# Patient Record
Sex: FEMALE | Race: WHITE | NOT HISPANIC OR LATINO | Employment: UNEMPLOYED | URBAN - METROPOLITAN AREA
[De-identification: names, ages, dates, MRNs, and addresses within clinical notes are randomized per-mention and may not be internally consistent; named-entity substitution may affect disease eponyms.]

---

## 2021-08-16 ENCOUNTER — TELEPHONE (OUTPATIENT)
Dept: NEPHROLOGY | Facility: CLINIC | Age: 56
End: 2021-08-16

## 2021-08-16 DIAGNOSIS — E87.5 HYPERKALEMIA: Primary | ICD-10-CM

## 2021-08-16 NOTE — TELEPHONE ENCOUNTER
[facility] Call Center    Phone Message    May a detailed message be left on voicemail: yes     Reason for Call: Order(s): Other:   Reason for requested: Order for labs to be sent to [facility].  Date needed: ASAP  Provider name: Dr. Galarza      Action Taken: Message routed to:  [facility] ([facility]): Nephrology    Travel Screening: Not Applicable

## 2021-08-23 NOTE — TELEPHONE ENCOUNTER
Future Lab Orders placed per Nephrology Protocol.  This N called [facility] [phone].  Obtained Lab Fax [phone].    Faxed Future Lab Order for Dr. Galarza with a Cover Sheet requesting results be Faxed back to Dr. Galarza.    Confirmation success using RightFax.    Kaylyn Terry LPN

## 2021-09-04 ENCOUNTER — HEALTH MAINTENANCE LETTER (OUTPATIENT)
Age: 56
End: 2021-09-04

## 2021-09-21 ENCOUNTER — OFFICE VISIT (OUTPATIENT)
Dept: FAMILY MEDICINE | Facility: CLINIC | Age: 56
End: 2021-09-21
Payer: COMMERCIAL

## 2021-09-21 VITALS
DIASTOLIC BLOOD PRESSURE: 84 MMHG | SYSTOLIC BLOOD PRESSURE: 130 MMHG | BODY MASS INDEX: 31.15 KG/M2 | HEIGHT: 63 IN | HEART RATE: 67 BPM | OXYGEN SATURATION: 97 % | WEIGHT: 175.8 LBS | RESPIRATION RATE: 16 BRPM | TEMPERATURE: 98.5 F

## 2021-09-21 DIAGNOSIS — I25.10 CORONARY ARTERY DISEASE INVOLVING NATIVE CORONARY ARTERY OF NATIVE HEART WITHOUT ANGINA PECTORIS: ICD-10-CM

## 2021-09-21 DIAGNOSIS — Z12.31 ENCOUNTER FOR SCREENING MAMMOGRAM FOR BREAST CANCER: ICD-10-CM

## 2021-09-21 DIAGNOSIS — I10 HYPERTENSION GOAL BP (BLOOD PRESSURE) < 140/90: ICD-10-CM

## 2021-09-21 DIAGNOSIS — Z79.4 TYPE 2 DIABETES MELLITUS WITH HYPERGLYCEMIA, WITH LONG-TERM CURRENT USE OF INSULIN (H): Primary | ICD-10-CM

## 2021-09-21 DIAGNOSIS — N18.32 STAGE 3B CHRONIC KIDNEY DISEASE (H): ICD-10-CM

## 2021-09-21 DIAGNOSIS — E11.65 TYPE 2 DIABETES MELLITUS WITH HYPERGLYCEMIA, WITH LONG-TERM CURRENT USE OF INSULIN (H): Primary | ICD-10-CM

## 2021-09-21 LAB
ANION GAP SERPL CALCULATED.3IONS-SCNC: 6 MMOL/L (ref 3–14)
BUN SERPL-MCNC: 20 MG/DL (ref 7–30)
CALCIUM SERPL-MCNC: 9.5 MG/DL (ref 8.5–10.1)
CHLORIDE BLD-SCNC: 107 MMOL/L (ref 94–109)
CO2 SERPL-SCNC: 26 MMOL/L (ref 20–32)
CREAT SERPL-MCNC: 1.59 MG/DL (ref 0.52–1.04)
GFR SERPL CREATININE-BSD FRML MDRD: 36 ML/MIN/1.73M2
GLUCOSE BLD-MCNC: 89 MG/DL (ref 70–99)
POTASSIUM BLD-SCNC: 4.7 MMOL/L (ref 3.4–5.3)
SODIUM SERPL-SCNC: 139 MMOL/L (ref 133–144)

## 2021-09-21 PROCEDURE — 36415 COLL VENOUS BLD VENIPUNCTURE: CPT | Performed by: FAMILY MEDICINE

## 2021-09-21 PROCEDURE — 99204 OFFICE O/P NEW MOD 45 MIN: CPT | Performed by: FAMILY MEDICINE

## 2021-09-21 PROCEDURE — 80048 BASIC METABOLIC PNL TOTAL CA: CPT | Performed by: FAMILY MEDICINE

## 2021-09-21 RX ORDER — ONDANSETRON 4 MG/1
TABLET, FILM COATED ORAL
COMMUNITY
Start: 2015-11-16

## 2021-09-21 RX ORDER — CARVEDILOL 6.25 MG/1
TABLET ORAL
COMMUNITY
Start: 2021-08-30 | End: 2021-09-21

## 2021-09-21 RX ORDER — FENOFIBRATE 160 MG/1
1 TABLET ORAL
COMMUNITY
Start: 2021-04-09 | End: 2021-10-06

## 2021-09-21 RX ORDER — CARVEDILOL 12.5 MG/1
12.5 TABLET ORAL 2 TIMES DAILY WITH MEALS
Qty: 180 TABLET | Refills: 3 | Status: SHIPPED | OUTPATIENT
Start: 2021-09-21 | End: 2022-10-03

## 2021-09-21 RX ORDER — LANOLIN ALCOHOL/MO/W.PET/CERES
400 CREAM (GRAM) TOPICAL
COMMUNITY
End: 2022-03-15

## 2021-09-21 RX ORDER — OMEPRAZOLE 40 MG/1
1 CAPSULE, DELAYED RELEASE ORAL
COMMUNITY
Start: 2021-06-04

## 2021-09-21 RX ORDER — SODIUM POLYSTYRENE SULFONATE 15 G/60ML
SUSPENSION ORAL; RECTAL
COMMUNITY
Start: 2021-08-18 | End: 2022-03-15

## 2021-09-21 RX ORDER — SUMATRIPTAN 50 MG/1
TABLET, FILM COATED ORAL
COMMUNITY
Start: 2016-12-12

## 2021-09-21 RX ORDER — FERROUS SULFATE 325(65) MG
TABLET ORAL
COMMUNITY
Start: 2021-09-01 | End: 2021-10-06

## 2021-09-21 RX ORDER — HYDROCODONE BITARTRATE AND ACETAMINOPHEN 5; 325 MG/1; MG/1
TABLET ORAL
COMMUNITY
Start: 2016-09-14 | End: 2022-12-07

## 2021-09-21 RX ORDER — CITALOPRAM HYDROBROMIDE 20 MG/1
20 TABLET ORAL EVERY MORNING
COMMUNITY
Start: 2021-08-27 | End: 2021-11-12

## 2021-09-21 RX ORDER — INSULIN ASPART 100 [IU]/ML
INJECTION, SOLUTION INTRAVENOUS; SUBCUTANEOUS
COMMUNITY
Start: 2020-12-03 | End: 2021-11-12

## 2021-09-21 RX ORDER — INSULIN GLARGINE 100 [IU]/ML
48 INJECTION, SOLUTION SUBCUTANEOUS
Status: CANCELLED | OUTPATIENT
Start: 2021-09-21

## 2021-09-21 RX ORDER — INSULIN GLARGINE 100 [IU]/ML
INJECTION, SOLUTION SUBCUTANEOUS
COMMUNITY
Start: 2021-09-10 | End: 2021-10-19

## 2021-09-21 RX ORDER — AMLODIPINE BESYLATE 5 MG/1
TABLET ORAL
COMMUNITY
Start: 2021-09-07 | End: 2021-10-06

## 2021-09-21 RX ORDER — L. ACIDOPHILUS/BIFIDO. LONGUM 15 MG
CAPSULE,DELAYED RELEASE (ENTERIC COATED) ORAL
COMMUNITY
Start: 2020-08-19

## 2021-09-21 RX ORDER — LANCETS
EACH MISCELLANEOUS
Qty: 1 EACH | Refills: 3 | Status: SHIPPED | OUTPATIENT
Start: 2021-09-21 | End: 2022-08-16

## 2021-09-21 RX ORDER — METFORMIN HCL 500 MG
500 TABLET, EXTENDED RELEASE 24 HR ORAL
COMMUNITY
Start: 2021-08-27 | End: 2021-11-12

## 2021-09-21 RX ORDER — GLUCOSAMINE HCL/CHONDROITIN SU 500-400 MG
CAPSULE ORAL
Qty: 100 EACH | Refills: 3 | Status: SHIPPED | OUTPATIENT
Start: 2021-09-21 | End: 2023-11-07

## 2021-09-21 RX ORDER — ESTRADIOL 2 MG/1
1 TABLET ORAL
COMMUNITY
Start: 2021-08-11 | End: 2022-11-08

## 2021-09-21 ASSESSMENT — MIFFLIN-ST. JEOR: SCORE: 1352.67

## 2021-09-21 NOTE — PROGRESS NOTES
"    Assessment & Plan     Type 2 diabetes mellitus with hyperglycemia, with long-term current use of insulin (H), controlled.  Recent A1c 2 weeks ago 6.6.  - AMB Adult Diabetes Educator Referral  - blood glucose (NO BRAND SPECIFIED) test strip  Dispense: 100 strip; Refill: 6  - blood glucose calibration (NO BRAND SPECIFIED) solution  Dispense: 100 each; Refill: 3  - thin (NO BRAND SPECIFIED) lancets  Dispense: 1 each; Refill: 3  - alcohol swab prep pads  Dispense: 100 each; Refill: 3  - Continue current management.  No refills needed at this time.    Hypertension goal BP (blood pressure) < 140/90, controlled  - Refill: carvedilol (COREG) 12.5 MG tablet  Dispense: 180 tablet; Refill: 3  - Basic metabolic panel  (Ca, Cl, CO2, Creat, Gluc, K, Na, BUN)    Coronary artery disease involving native coronary artery of native heart without angina pectoris, stable  - On medication management.  -Following with cardiology.    Stage 3b chronic kidney disease, stable  -  Basic metabolic panel  (Ca, Cl, CO2, Creat, Gluc, K, Na, BUN)  - Following with Nephrology.    Encounter for screening mammogram for breast cancer  - REVIEW OF HEALTH MAINTENANCE PROTOCOL ORDERS  - *MA Screening Digital Bilateral      BMI:   Estimated body mass index is 30.76 kg/m  as calculated from the following:    Height as of this encounter: 1.61 m (5' 3.39\").    Weight as of this encounter: 79.7 kg (175 lb 12.8 oz).   Weight management plan: Discussed healthy diet and exercise guidelines        Return in about 3 months (around 12/21/2021) for Diabetes Follow Up with a HgbA1C prior to visit.    Aileen Wells MD  [facility] FAWAD Montelongo is a 56 year old who presents for the following health issues      HPI     New Patient/Transfer of Care   Due to change of insurance, pt is needing to establish care.  Previous PCP- John  Does not need any medication refills but would like to get refills on diabetic testing supplies: test " strips, and needles.     Was prescribed Jardiance by nephrologist but she did not yet start this med due to starting multiple other medications around the same time.     Discuss if she should have potassium rechecked.    DIABETES - Patient has a longstanding history of DiabetesType Type II . Patient is being treated with oral agents and insulin injections and denies significant side effects. Control has been good.  Recent A1c 6.6.  Complicating factors include but are not limited to: hypertension, hyperlipidemia, chronic kidney disease and CAD/PVD.   Reports that she is struggling to afford her medications due to her poor insurance coverage.    HYPERLIPIDEMIA - Patient has a long history of significant Hyperlipidemia requiring medication for treatment with recent unable to assess control. Patient reports she is unable to tolerate statins- currently on Fenofibrate. Reports no problems or side effects with the medication.      HYPERTENSION - Patient has longstanding history of HTN , currently denies any symptoms referable to elevated blood pressure. Specifically denies chest pain, palpitations, dyspnea, orthopnea, PND or peripheral edema. Blood pressure readings have been in normal range. Current medication regimen is as listed below. Patient denies any side effects of medication- Coreg, Norvasc.   BP Readings from Last 3 Encounters:   09/21/21 130/84        RENAL INSUFFICIENCY - Was recently admitted with VETO/hyperkalemia. Nephrology was consulted during admission. Was doing weekly potassium checks with her PCP.   States that she is due to have her potassium checked. Last Cr 1.39, eGFR 39.   Following with Nephrology.     CAD - Patient has a longstanding history of non-obstructive CAD.   Presented to the ER on 5/5/2021 with chest pain, found to have an NSTEMI. Underwent coronary angiogram 5/7/21 which demonstrated no significant CAD. LVEDP was normal. Current  Patient denies recent chest pain or NTG use, denies  "exercise induced dyspnea or PND. Following with Cardiology.     HEALTH CARE MAINTENANCE: Due for Mammogram      Review of Systems   Constitutional, HEENT, cardiovascular, pulmonary, gi and gu systems are negative, except as otherwise noted.      Objective    /84   Pulse 67   Temp 98.5  F (36.9  C) (Tympanic)   Resp 16   Ht 1.61 m (5' 3.39\")   Wt 79.7 kg (175 lb 12.8 oz)   SpO2 97%   Breastfeeding No   BMI 30.76 kg/m    Body mass index is 30.76 kg/m .  Physical Exam   GENERAL: healthy, alert and no distress  RESP: lungs clear to auscultation - no rales, rhonchi or wheezes  CV: regular rate and rhythm, normal S1 S2, no S3 or S4, no murmur, click or rub, no peripheral edema and peripheral pulses strong  PSYCH: mentation appears normal, affect normal/bright    DATA  Previous labs reviewed.             "

## 2021-09-22 ENCOUNTER — TELEPHONE (OUTPATIENT)
Dept: FAMILY MEDICINE | Facility: CLINIC | Age: 56
End: 2021-09-22
Payer: COMMERCIAL

## 2021-09-22 NOTE — TELEPHONE ENCOUNTER
Diabetes Education Scheduling Outreach #1:    Call to patient to schedule. Left message with phone number to call to schedule.    Plan for 2nd outreach attempt within 1 week.    Buffy Ramirez  [facility] [facility]  Diabetes and Nutrition Scheduling

## 2021-09-27 NOTE — TELEPHONE ENCOUNTER
Diabetes Education Scheduling Outreach #2:    Call to patient to schedule. Left message with phone number to call to schedule.        Erin Kitchen  [place] [facility]  Diabetes and Nutrition Scheduling

## 2021-10-04 DIAGNOSIS — D64.9 MILD ANEMIA: ICD-10-CM

## 2021-10-04 DIAGNOSIS — E78.5 HYPERLIPIDEMIA LDL GOAL <100: ICD-10-CM

## 2021-10-04 DIAGNOSIS — E11.40 TYPE 2 DIABETES MELLITUS WITH DIABETIC NEUROPATHY, WITH LONG-TERM CURRENT USE OF INSULIN (H): Primary | ICD-10-CM

## 2021-10-04 DIAGNOSIS — I10 HYPERTENSION GOAL BP (BLOOD PRESSURE) < 140/90: ICD-10-CM

## 2021-10-04 DIAGNOSIS — Z79.4 TYPE 2 DIABETES MELLITUS WITH DIABETIC NEUROPATHY, WITH LONG-TERM CURRENT USE OF INSULIN (H): Primary | ICD-10-CM

## 2021-10-05 NOTE — TELEPHONE ENCOUNTER
Routing refill request to provider for review/approval because:  Prescribed by previous provider with [facility]    Last Comprehensive Metabolic Panel:  Sodium   Date Value Ref Range Status   09/21/2021 139 133 - 144 mmol/L Final     Potassium   Date Value Ref Range Status   09/21/2021 4.7 3.4 - 5.3 mmol/L Final     Chloride   Date Value Ref Range Status   09/21/2021 107 94 - 109 mmol/L Final     Carbon Dioxide (CO2)   Date Value Ref Range Status   09/21/2021 26 20 - 32 mmol/L Final     Anion Gap   Date Value Ref Range Status   09/21/2021 6 3 - 14 mmol/L Final     Glucose   Date Value Ref Range Status   09/21/2021 89 70 - 99 mg/dL Final     Urea Nitrogen   Date Value Ref Range Status   09/21/2021 20 7 - 30 mg/dL Final     Creatinine   Date Value Ref Range Status   09/21/2021 1.59 (H) 0.52 - 1.04 mg/dL Final     GFR Estimate   Date Value Ref Range Status   09/21/2021 36 (L) >60 mL/min/1.73m2 Final     Comment:     As of July 11, 2021, eGFR is calculated by the CKD-EPI creatinine equation, without race adjustment. eGFR can be influenced by muscle mass, exercise, and diet. The reported eGFR is an estimation only and is only applicable if the renal function is stable.     Calcium   Date Value Ref Range Status   09/21/2021 9.5 8.5 - 10.1 mg/dL Final

## 2021-10-06 RX ORDER — FENOFIBRATE 160 MG/1
TABLET ORAL
Qty: 90 TABLET | Refills: 1 | Status: SHIPPED | OUTPATIENT
Start: 2021-10-06 | End: 2022-04-06

## 2021-10-06 RX ORDER — AMLODIPINE BESYLATE 5 MG/1
TABLET ORAL
Qty: 60 TABLET | Refills: 2 | Status: SHIPPED | OUTPATIENT
Start: 2021-10-06 | End: 2021-11-12 | Stop reason: DRUGHIGH

## 2021-10-06 RX ORDER — FERROUS SULFATE 325(65) MG
TABLET ORAL
Qty: 30 TABLET | Refills: 2 | Status: SHIPPED | OUTPATIENT
Start: 2021-10-06 | End: 2021-12-27

## 2021-10-17 DIAGNOSIS — Z79.4 TYPE 2 DIABETES MELLITUS WITH DIABETIC NEUROPATHY, WITH LONG-TERM CURRENT USE OF INSULIN (H): Primary | ICD-10-CM

## 2021-10-17 DIAGNOSIS — E11.40 TYPE 2 DIABETES MELLITUS WITH DIABETIC NEUROPATHY, WITH LONG-TERM CURRENT USE OF INSULIN (H): Primary | ICD-10-CM

## 2021-10-19 ENCOUNTER — MYC MEDICAL ADVICE (OUTPATIENT)
Dept: FAMILY MEDICINE | Facility: CLINIC | Age: 56
End: 2021-10-19

## 2021-10-19 RX ORDER — INSULIN GLARGINE 100 [IU]/ML
58 INJECTION, SOLUTION SUBCUTANEOUS AT BEDTIME
Qty: 45 ML | Refills: 0 | Status: SHIPPED | OUTPATIENT
Start: 2021-10-19 | End: 2021-11-12

## 2021-10-19 NOTE — TELEPHONE ENCOUNTER
Routed other refill request for the basaglar to Dr. Wells to address, historical med.    Routed message to patient to clarify PCP and will update in chart when she responds.    Tracie Palencia RN  [facility]

## 2021-10-25 ENCOUNTER — MYC REFILL (OUTPATIENT)
Dept: FAMILY MEDICINE | Facility: CLINIC | Age: 56
End: 2021-10-25

## 2021-10-25 DIAGNOSIS — E11.40 TYPE 2 DIABETES MELLITUS WITH DIABETIC NEUROPATHY, WITH LONG-TERM CURRENT USE OF INSULIN (H): ICD-10-CM

## 2021-10-25 DIAGNOSIS — Z79.4 TYPE 2 DIABETES MELLITUS WITH DIABETIC NEUROPATHY, WITH LONG-TERM CURRENT USE OF INSULIN (H): ICD-10-CM

## 2021-10-27 NOTE — TELEPHONE ENCOUNTER
Requested Prescriptions   Pending Prescriptions Disp Refills     pregabalin (LYRICA) 100 MG capsule 60 capsule 0     Sig: Take 1 capsule (100 mg) by mouth 2 times daily       There is no refill protocol information for this order        Routing refill request to provider for review/approval because:  Drug not on the G refill protocol

## 2021-10-28 ENCOUNTER — MYC REFILL (OUTPATIENT)
Dept: FAMILY MEDICINE | Facility: CLINIC | Age: 56
End: 2021-10-28

## 2021-10-28 DIAGNOSIS — E11.40 TYPE 2 DIABETES MELLITUS WITH DIABETIC NEUROPATHY, WITH LONG-TERM CURRENT USE OF INSULIN (H): ICD-10-CM

## 2021-10-28 DIAGNOSIS — Z79.4 TYPE 2 DIABETES MELLITUS WITH DIABETIC NEUROPATHY, WITH LONG-TERM CURRENT USE OF INSULIN (H): ICD-10-CM

## 2021-10-28 RX ORDER — PREGABALIN 100 MG/1
100 CAPSULE ORAL 2 TIMES DAILY
Qty: 180 CAPSULE | Refills: 3 | Status: SHIPPED | OUTPATIENT
Start: 2021-10-28 | End: 2022-03-15

## 2021-10-29 RX ORDER — PREGABALIN 100 MG/1
100 CAPSULE ORAL 2 TIMES DAILY
Qty: 60 CAPSULE | Refills: 0 | OUTPATIENT
Start: 2021-10-29

## 2021-10-30 ENCOUNTER — HEALTH MAINTENANCE LETTER (OUTPATIENT)
Age: 56
End: 2021-10-30

## 2021-11-11 ENCOUNTER — LAB (OUTPATIENT)
Dept: LAB | Facility: CLINIC | Age: 56
End: 2021-11-11
Payer: COMMERCIAL

## 2021-11-11 DIAGNOSIS — Z79.4 TYPE 2 DIABETES MELLITUS WITH DIABETIC NEUROPATHY, WITH LONG-TERM CURRENT USE OF INSULIN (H): Primary | ICD-10-CM

## 2021-11-11 DIAGNOSIS — Z11.59 NEED FOR HEPATITIS C SCREENING TEST: ICD-10-CM

## 2021-11-11 DIAGNOSIS — Z86.39 HISTORY OF HYPOKALEMIA: ICD-10-CM

## 2021-11-11 DIAGNOSIS — Z13.220 SCREENING FOR HYPERLIPIDEMIA: ICD-10-CM

## 2021-11-11 DIAGNOSIS — E11.40 TYPE 2 DIABETES MELLITUS WITH DIABETIC NEUROPATHY, WITH LONG-TERM CURRENT USE OF INSULIN (H): Primary | ICD-10-CM

## 2021-11-11 DIAGNOSIS — Z11.4 SCREENING FOR HIV (HUMAN IMMUNODEFICIENCY VIRUS): ICD-10-CM

## 2021-11-11 LAB — POTASSIUM BLD-SCNC: 5.9 MMOL/L (ref 3.4–5.3)

## 2021-11-11 PROCEDURE — 36415 COLL VENOUS BLD VENIPUNCTURE: CPT

## 2021-11-11 PROCEDURE — 84132 ASSAY OF SERUM POTASSIUM: CPT

## 2021-11-12 ENCOUNTER — OFFICE VISIT (OUTPATIENT)
Dept: FAMILY MEDICINE | Facility: CLINIC | Age: 56
End: 2021-11-12
Payer: COMMERCIAL

## 2021-11-12 ENCOUNTER — TELEPHONE (OUTPATIENT)
Dept: FAMILY MEDICINE | Facility: CLINIC | Age: 56
End: 2021-11-12
Payer: COMMERCIAL

## 2021-11-12 VITALS
RESPIRATION RATE: 16 BRPM | WEIGHT: 180.4 LBS | BODY MASS INDEX: 31.57 KG/M2 | SYSTOLIC BLOOD PRESSURE: 130 MMHG | OXYGEN SATURATION: 97 % | DIASTOLIC BLOOD PRESSURE: 78 MMHG | HEART RATE: 61 BPM | TEMPERATURE: 97.1 F

## 2021-11-12 DIAGNOSIS — E87.5 HYPERKALEMIA: ICD-10-CM

## 2021-11-12 DIAGNOSIS — M77.11 RIGHT LATERAL EPICONDYLITIS: Primary | ICD-10-CM

## 2021-11-12 DIAGNOSIS — E87.5 HYPERKALEMIA: Primary | ICD-10-CM

## 2021-11-12 DIAGNOSIS — E11.40 TYPE 2 DIABETES MELLITUS WITH DIABETIC NEUROPATHY, WITH LONG-TERM CURRENT USE OF INSULIN (H): ICD-10-CM

## 2021-11-12 DIAGNOSIS — I10 HYPERTENSION GOAL BP (BLOOD PRESSURE) < 140/90: ICD-10-CM

## 2021-11-12 DIAGNOSIS — Z79.4 TYPE 2 DIABETES MELLITUS WITH DIABETIC NEUROPATHY, WITH LONG-TERM CURRENT USE OF INSULIN (H): ICD-10-CM

## 2021-11-12 PROCEDURE — 99214 OFFICE O/P EST MOD 30 MIN: CPT | Performed by: FAMILY MEDICINE

## 2021-11-12 RX ORDER — SODIUM POLYSTYRENE SULFONATE 15 G/60ML
15 SUSPENSION ORAL; RECTAL ONCE
Qty: 60 ML | Refills: 0 | Status: SHIPPED | OUTPATIENT
Start: 2021-11-12 | End: 2021-12-15

## 2021-11-12 RX ORDER — INSULIN GLARGINE 100 [IU]/ML
50 INJECTION, SOLUTION SUBCUTANEOUS AT BEDTIME
Qty: 45 ML | Refills: 0 | Status: SHIPPED | OUTPATIENT
Start: 2021-11-12 | End: 2021-12-14

## 2021-11-12 RX ORDER — AMLODIPINE BESYLATE 10 MG/1
10 TABLET ORAL DAILY
Qty: 90 TABLET | Refills: 3 | Status: SHIPPED | OUTPATIENT
Start: 2021-11-12 | End: 2022-11-28

## 2021-11-12 RX ORDER — INSULIN ASPART 100 [IU]/ML
INJECTION, SOLUTION INTRAVENOUS; SUBCUTANEOUS
Status: CANCELLED | OUTPATIENT
Start: 2021-11-12

## 2021-11-12 NOTE — PROGRESS NOTES
Assessment & Plan       Right lateral epicondylitis  - Offered referral to Physical Therapy for eccentric strengthening exercises. Patient declined stating her insurance would not cover. Prefers referral to Orthopedics.   - Orthopedic  Referral  - diclofenac (VOLTAREN) 1 % topical gel  Dispense: 100 g; Refill: 0  - In the interim continue to use Counterforce Brace.    Hyperkalemia  - Kayexalate Rx sent in this morning. Patient took it this morning.  Repeat labs in AM   - Basic metabolic panel  (Ca, Cl, CO2, Creat, Gluc, K, Na, BUN)  - Following with Nephrology.     Hypertension goal BP (blood pressure) < 140/90, controlled.  - Refill: amLODIPine (NORVASC) 10 MG tablet  Dispense: 90 tablet; Refill: 3  - Basic metabolic panel  (Ca, Cl, CO2, Creat, Gluc, K, Na, BUN)    Type 2 diabetes mellitus with diabetic neuropathy, with long-term current use of insulin (H), recent A1C in 9/2021 was 6.6  - Refill: insulin glargine (BASAGLAR KWIKPEN) 100 UNIT/ML pen  Dispense: 45 mL; Refill: 0  - Refill: insulin aspart (NOVOLOG PEN) 100 UNIT/ML pen  Dispense: 30 mL; Refill: 3      Return in about 1 month (around 12/12/2021) for Diabetes Follow Up with a HgbA1C prior to visit.    Aileen Wells MD  [facility] FAWAD Montelongo is a 56 year old who presents for the following health issues     HPI     Musculoskeletal problem/pain  Onset/Duration: x 3 months  Description  Location: elbow - right  Joint Swelling: no  Redness: no  Pain: YES- constant sensitivity.  Stabbing pain with movement,   Warmth: no  Intensity:  Constant 6 /10 .   9-10/10 with use  Progression of Symptoms:  worsening  Accompanying signs and symptoms:   Fevers: no  Numbness/tingling/weakness: YES  History  Trauma to the area: no  Recent illness:  no  Previous similar problem: YES  Previous evaluation:  YES- seen through [facility] initially 8/18/21  Precipitating or alleviating factors:  Aggravating factors include: movement,  clicking computer mouse, lifting of arm   Therapies tried and outcome: tennis elbow brace, iced, exercises, and NSAID topical- no relief.       Diabetes follow up will be due end of December. Last A1C was 6.6 in 9/2021, outside labs. Requesting a refill on her insulin.   Discuss elevated potassium level. Picked her Rx of Kayexalate and took it this morning.   Patient has lab only appointment at [facility] for tomorrow to repeat.    Declined discussing health maintenance at this time. Has COBRA insurance which is currently quite expensive for her.         Review of Systems   Constitutional, HEENT, cardiovascular, pulmonary, gi and gu systems are negative, except as otherwise noted.      Objective    /78   Pulse 61   Temp 97.1  F (36.2  C) (Tympanic)   Resp 16   Wt 81.8 kg (180 lb 6.4 oz)   SpO2 97%   Breastfeeding No   BMI 31.57 kg/m    Body mass index is 31.57 kg/m .  Physical Exam   GENERAL: healthy, alert and no distress  MS: Right Elbow- Tenderness over the right lateral epicondyle. Pain with resisted wrist extension with elbow in full extension.     DATA  Reviewed and discussed with patient prior to discharge.      Component      Latest Ref Rng & Units 11/11/2021   Potassium      3.4 - 5.3 mmol/L 5.9 (H)

## 2021-11-12 NOTE — TELEPHONE ENCOUNTER
Patient called.   Reports feeling well despite the elevated K+  Component      Latest Ref Rng & Units 11/11/2021   Potassium      3.4 - 5.3 mmol/L 5.9 (H)     Will send in Rx for Kayexalate to take today and repeat labs tomorrow. Rx sent. Future labs ordered.   Patient reports that she has taken this multiple times in the past without any side effects and has an upcoming appointment with her Nephrologist in 1-2 weeks.     Will see her at her appointment later today at 11:30 am.

## 2021-11-12 NOTE — TELEPHONE ENCOUNTER
Patient called regarding her potassium level of :  Potassium   Date Value Ref Range Status   11/11/2021 5.9 (H) 3.4 - 5.3 mmol/L Final       She stated that other providers have sent in a script for Kayexalate for her to take orally, instead of sending her to the hospital.    Patient stated that she has suppositories at the [facility] pharmacy, but she needs the oral medication instead.    Patient has an appointment with PCP at 11:30 today, but is going out of town after, and would like this taken care of asap.    Patient stated that she will not go to the ER.      Routed to PCP to please advise.      Patrizia RN,BSN  Triage Nurse  [facility]  Ph: [phone]

## 2021-11-13 ENCOUNTER — LAB (OUTPATIENT)
Dept: LAB | Facility: CLINIC | Age: 56
End: 2021-11-13
Payer: COMMERCIAL

## 2021-11-13 DIAGNOSIS — I10 HYPERTENSION GOAL BP (BLOOD PRESSURE) < 140/90: ICD-10-CM

## 2021-11-13 DIAGNOSIS — E87.5 HYPERKALEMIA: ICD-10-CM

## 2021-11-13 PROCEDURE — 36415 COLL VENOUS BLD VENIPUNCTURE: CPT

## 2021-11-13 PROCEDURE — 80048 BASIC METABOLIC PNL TOTAL CA: CPT

## 2021-11-15 LAB
ANION GAP SERPL CALCULATED.3IONS-SCNC: 8 MMOL/L (ref 3–14)
BUN SERPL-MCNC: 39 MG/DL (ref 7–30)
CALCIUM SERPL-MCNC: 8.9 MG/DL (ref 8.5–10.1)
CHLORIDE BLD-SCNC: 100 MMOL/L (ref 94–109)
CO2 SERPL-SCNC: 26 MMOL/L (ref 20–32)
CREAT SERPL-MCNC: 1.67 MG/DL (ref 0.52–1.04)
GFR SERPL CREATININE-BSD FRML MDRD: 33 ML/MIN/1.73M2
GLUCOSE BLD-MCNC: 304 MG/DL (ref 70–99)
POTASSIUM BLD-SCNC: 4.7 MMOL/L (ref 3.4–5.3)
SODIUM SERPL-SCNC: 134 MMOL/L (ref 133–144)

## 2021-11-24 ENCOUNTER — TRANSFERRED RECORDS (OUTPATIENT)
Dept: HEALTH INFORMATION MANAGEMENT | Facility: CLINIC | Age: 56
End: 2021-11-24

## 2021-11-24 ENCOUNTER — OFFICE VISIT (OUTPATIENT)
Dept: ORTHOPEDICS | Facility: CLINIC | Age: 56
End: 2021-11-24
Payer: COMMERCIAL

## 2021-11-24 VITALS
HEIGHT: 63 IN | DIASTOLIC BLOOD PRESSURE: 72 MMHG | WEIGHT: 180 LBS | SYSTOLIC BLOOD PRESSURE: 131 MMHG | BODY MASS INDEX: 31.89 KG/M2

## 2021-11-24 DIAGNOSIS — M77.11 LATERAL EPICONDYLITIS OF RIGHT ELBOW: Primary | ICD-10-CM

## 2021-11-24 DIAGNOSIS — M25.521 CHRONIC PAIN OF RIGHT ELBOW: ICD-10-CM

## 2021-11-24 DIAGNOSIS — M77.8 TENDINITIS OF RIGHT TRICEPS: ICD-10-CM

## 2021-11-24 DIAGNOSIS — G89.29 CHRONIC PAIN OF RIGHT ELBOW: ICD-10-CM

## 2021-11-24 PROCEDURE — 99204 OFFICE O/P NEW MOD 45 MIN: CPT | Mod: 25 | Performed by: FAMILY MEDICINE

## 2021-11-24 PROCEDURE — 76942 ECHO GUIDE FOR BIOPSY: CPT | Performed by: FAMILY MEDICINE

## 2021-11-24 PROCEDURE — 20551 NJX 1 TENDON ORIGIN/INSJ: CPT | Mod: RT | Performed by: FAMILY MEDICINE

## 2021-11-24 RX ORDER — TRIAMCINOLONE ACETONIDE 40 MG/ML
40 INJECTION, SUSPENSION INTRA-ARTICULAR; INTRAMUSCULAR
Status: SHIPPED | OUTPATIENT
Start: 2021-11-24

## 2021-11-24 RX ORDER — LIDOCAINE HYDROCHLORIDE 10 MG/ML
2 INJECTION, SOLUTION INFILTRATION; PERINEURAL
Status: SHIPPED | OUTPATIENT
Start: 2021-11-24

## 2021-11-24 RX ADMIN — LIDOCAINE HYDROCHLORIDE 2 ML: 10 INJECTION, SOLUTION INFILTRATION; PERINEURAL at 10:10

## 2021-11-24 RX ADMIN — TRIAMCINOLONE ACETONIDE 40 MG: 40 INJECTION, SUSPENSION INTRA-ARTICULAR; INTRAMUSCULAR at 10:10

## 2021-11-24 ASSESSMENT — MIFFLIN-ST. JEOR: SCORE: 1371.95

## 2021-11-24 NOTE — PROGRESS NOTES
"Jayda Berrios  :  1965  DOS: 2021  MRN: [id number]    Sports Medicine Clinic Visit    PCP: Aileen Wells    Jayda Berrios is a 56 year old Right hand dominant female who is seen in consultation at the request of  Aileen Wells M.D. presenting with acute right elbow pain.    Injury: Gradual onset of pain over the past ~ 4 - 5 months.  Pain located over right lateral elbow, radiating to dorsal forearm and lateral upper arm.  Additional Features:  Positive: weakness.  Symptoms are better with Ibuprofen and Rest.  Symptoms are worse with: gripping/grasping, typing, lifting.  Other evaluation and/or treatments so far consists of: Ice, Ibuprofen, Rest and topical diclofenac gel, counter-force strap.  Recent imaging completed: No recent imaging completed.  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    Past Medical History:   Diagnosis Date     History of migraine headaches     on Imitrex, Hydrocodone, NSAID prn     NSTEMI (non-ST elevated myocardial infarction) (H) 2021     Tarsal tunnel syndrome of both lower extremities      Type 2 diabetes mellitus with hyperglycemia, with long-term current use of insulin (H)      Past Surgical History:   Procedure Laterality Date     HYSTERECTOMY, PAP NO LONGER INDICATED Bilateral     Endometriosis at age 32     LAPAROSCOPIC CHOLECYSTECTOMY       LUMBAR DISCECTOMY      at age 38- back pain free.     Family History   Problem Relation Age of Onset     Breast Cancer No family hx of      Colon Cancer No family hx of        Objective  /72   Ht 1.61 m (5' 3.4\")   Wt 81.6 kg (180 lb)   BMI 31.48 kg/m        General: healthy, alert and in no distress      HEENT: no scleral icterus or " conjunctival erythema     Skin: no suspicious lesions or rash. No jaundice.     CV: regular rhythm by palpation, 2+ distal pulses, no pedal edema      Resp: normal respiratory effort without conversational dyspnea     Psych: normal mood and affect      Gait: nonantalgic, appropriate coordination and balance     Neuro: normal light touch sensory exam of the extremities. Motor strength as noted below     Right Elbow exam:    Inspection:       no ecchymosis       no edema or effusion    ROM:       full with flexion, extension, forearm supination and pronation.       Painful terminal flexion, extension, supination>>pronation    Strength:       flexion 5/5       extension 5/5       forearm supination 5/5       forearm pronation 5/5    Tender:       lateral epicondyle       Distal triceps       Extensor/supinator mass    Non-Tender:       remainder of elbow area       medial epicondyle       radial head       olecranon       antecubital space    Sensation:       intact throughout hand       intact throughout forearm     Skin:       well perfused       capillary refill less than 2 seconds      Radiology:  No XR today    Hand / Upper Extremity Injection/Arthrocentesis: R elbow    Date/Time: 11/24/2021 10:10 AM  Performed by: Josh Perkins DO  Authorized by: Josh Perkins DO     Indications:  Pain, tendon swelling and therapeutic  Needle Size:  25 G  Guidance: ultrasound    Approach:  Lateral  Condition: epicondylitis, lateral      Site:  R elbow  Medications:  40 mg triamcinolone 40 MG/ML; 2 mL lidocaine 1 %  Procedure discussed: discussed risks, benefits, and alternatives    Consent Given by:  Patient  Timeout: timeout called immediately prior to procedure    Prep: patient was prepped and draped in usual sterile fashion          Assessment:  1. Lateral epicondylitis of right elbow    2. Chronic pain of right elbow    3. Tendinitis of right triceps        Plan:  Discussed the assessment with the  patient.  Follow up: prn based on clinical progress  Chronic tennis elbow sx, now with increased generalized elbow pain and triceps tendon pain from compensation and disuse  Wrist brace provided to help rest tendon  Reviewed activity modification strategies and OT options, declined OT for now  Basic HEP reviewed  US guided lateral epicondyle CSI today, reviewed goals and relative risks, reviewed that this is not an injection that we generally repeat due to fear of weakening tendon  Compression sleeve provided  Expectations and goals of CSI reviewed  Often 2-3 days for steroid effect, and can take up to two weeks for maximum effect  We discussed modified progressive pain-free activity as tolerated  Do not overuse in first two weeks if feeling better due to concern for vulnerability while steroid is working  Supportive care reviewed  All questions were answered today  Contact us with additional questions or concerns  Signs and sx of concern reviewed    Thanks very much for sending this nice lady to us, I will keep you updated with her progress      Josh Perkins DO, [facility]  Sports Medicine Physician  [facility] and [facility] Medicine      Time spent in chart review, one-on-one evaluation, discussion with patient regarding nature of problem, course, prior treatments, and therapeutic options, share-decision making, procedures and referrals as appropriate/documented, and charting related to the visit: 32 minutes          Disclaimer: This note consists of symbols derived from keyboarding, dictation and/or voice recognition software. As a result, there may be errors in the script that have gone undetected. Please consider this when interpreting information found in this chart.

## 2021-11-24 NOTE — LETTER
2021         RE: Jayda Berrios  [address]  [place] [address]        Dear Colleague,    Thank you for referring your patient, Jayda Berrios, to the [facility] [place]. Please see a copy of my visit note below.    Jayda Berrios  :  1965  DOS: 2021  MRN: [id number]    Sports Medicine Clinic Visit    PCP: Aileen Wells    Jayda Berrios is a 56 year old Right hand dominant female who is seen in consultation at the request of  Aileen Wells M.D. presenting with acute right elbow pain.    Injury: Gradual onset of pain over the past ~ 4 - 5 months.  Pain located over right lateral elbow, radiating to dorsal forearm and lateral upper arm.  Additional Features:  Positive: weakness.  Symptoms are better with Ibuprofen and Rest.  Symptoms are worse with: gripping/grasping, typing, lifting.  Other evaluation and/or treatments so far consists of: Ice, Ibuprofen, Rest and topical diclofenac gel, counter-force strap.  Recent imaging completed: No recent imaging completed.  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    Past Medical History:   Diagnosis Date     History of migraine headaches     on Imitrex, Hydrocodone, NSAID prn     NSTEMI (non-ST elevated myocardial infarction) (H) 2021     Tarsal tunnel syndrome of both lower extremities      Type 2 diabetes mellitus with hyperglycemia, with long-term current use of insulin (H)      Past Surgical History:   Procedure Laterality Date     HYSTERECTOMY, PAP NO LONGER INDICATED Bilateral     Endometriosis at age 32     LAPAROSCOPIC CHOLECYSTECTOMY       LUMBAR DISCECTOMY      at age 38- back pain free.     Family History   Problem  "Relation Age of Onset     Breast Cancer No family hx of      Colon Cancer No family hx of        Objective  /72   Ht 1.61 m (5' 3.4\")   Wt 81.6 kg (180 lb)   BMI 31.48 kg/m        General: healthy, alert and in no distress      HEENT: no scleral icterus or conjunctival erythema     Skin: no suspicious lesions or rash. No jaundice.     CV: regular rhythm by palpation, 2+ distal pulses, no pedal edema      Resp: normal respiratory effort without conversational dyspnea     Psych: normal mood and affect      Gait: nonantalgic, appropriate coordination and balance     Neuro: normal light touch sensory exam of the extremities. Motor strength as noted below     Right Elbow exam:    Inspection:       no ecchymosis       no edema or effusion    ROM:       full with flexion, extension, forearm supination and pronation.       Painful terminal flexion, extension, supination>>pronation    Strength:       flexion 5/5       extension 5/5       forearm supination 5/5       forearm pronation 5/5    Tender:       lateral epicondyle       Distal triceps       Extensor/supinator mass    Non-Tender:       remainder of elbow area       medial epicondyle       radial head       olecranon       antecubital space    Sensation:       intact throughout hand       intact throughout forearm     Skin:       well perfused       capillary refill less than 2 seconds      Radiology:  No XR today    Hand / Upper Extremity Injection/Arthrocentesis: R elbow    Date/Time: 11/24/2021 10:10 AM  Performed by: Josh Perkins DO  Authorized by: Josh Perkins DO     Indications:  Pain, tendon swelling and therapeutic  Needle Size:  25 G  Guidance: ultrasound    Approach:  Lateral  Condition: epicondylitis, lateral      Site:  R elbow  Medications:  40 mg triamcinolone 40 MG/ML; 2 mL lidocaine 1 %  Procedure discussed: discussed risks, benefits, and alternatives    Consent Given by:  Patient  Timeout: timeout called immediately " prior to procedure    Prep: patient was prepped and draped in usual sterile fashion          Assessment:  1. Lateral epicondylitis of right elbow    2. Chronic pain of right elbow    3. Tendinitis of right triceps        Plan:  Discussed the assessment with the patient.  Follow up: prn based on clinical progress  Chronic tennis elbow sx, now with increased generalized elbow pain and triceps tendon pain from compensation and disuse  Wrist brace provided to help rest tendon  Reviewed activity modification strategies and OT options, declined OT for now  Basic HEP reviewed  US guided lateral epicondyle CSI today, reviewed goals and relative risks, reviewed that this is not an injection that we generally repeat due to fear of weakening tendon  Compression sleeve provided  Expectations and goals of CSI reviewed  Often 2-3 days for steroid effect, and can take up to two weeks for maximum effect  We discussed modified progressive pain-free activity as tolerated  Do not overuse in first two weeks if feeling better due to concern for vulnerability while steroid is working  Supportive care reviewed  All questions were answered today  Contact us with additional questions or concerns  Signs and sx of concern reviewed    Thanks very much for sending this nice lady to us, I will keep you updated with her progress      Josh Perkins DO, CA  Sports Medicine Physician  [facility]      Time spent in chart review, one-on-one evaluation, discussion with patient regarding nature of problem, course, prior treatments, and therapeutic options, share-decision making, procedures and referrals as appropriate/documented, and charting related to the visit: 32 minutes          Disclaimer: This note consists of symbols derived from keyboarding, dictation and/or voice recognition software. As a result, there may be errors in the script that have gone undetected. Please consider this when interpreting information  found in this chart.      Again, thank you for allowing me to participate in the care of your patient.        Sincerely,        Josh Perkins, DO

## 2021-12-14 ENCOUNTER — OFFICE VISIT (OUTPATIENT)
Dept: FAMILY MEDICINE | Facility: CLINIC | Age: 56
End: 2021-12-14
Payer: COMMERCIAL

## 2021-12-14 VITALS
BODY MASS INDEX: 30.96 KG/M2 | HEART RATE: 69 BPM | OXYGEN SATURATION: 98 % | WEIGHT: 177 LBS | DIASTOLIC BLOOD PRESSURE: 75 MMHG | TEMPERATURE: 97.9 F | SYSTOLIC BLOOD PRESSURE: 126 MMHG | RESPIRATION RATE: 16 BRPM

## 2021-12-14 DIAGNOSIS — I10 HYPERTENSION GOAL BP (BLOOD PRESSURE) < 140/90: ICD-10-CM

## 2021-12-14 DIAGNOSIS — N18.32 STAGE 3B CHRONIC KIDNEY DISEASE (H): ICD-10-CM

## 2021-12-14 DIAGNOSIS — E11.40 TYPE 2 DIABETES MELLITUS WITH DIABETIC NEUROPATHY, WITH LONG-TERM CURRENT USE OF INSULIN (H): Primary | ICD-10-CM

## 2021-12-14 DIAGNOSIS — Z79.4 TYPE 2 DIABETES MELLITUS WITH DIABETIC NEUROPATHY, WITH LONG-TERM CURRENT USE OF INSULIN (H): Primary | ICD-10-CM

## 2021-12-14 LAB
ANION GAP SERPL CALCULATED.3IONS-SCNC: 6 MMOL/L (ref 3–14)
BUN SERPL-MCNC: 43 MG/DL (ref 7–30)
CALCIUM SERPL-MCNC: 9.7 MG/DL (ref 8.5–10.1)
CHLORIDE BLD-SCNC: 106 MMOL/L (ref 94–109)
CHOLEST SERPL-MCNC: 203 MG/DL
CO2 SERPL-SCNC: 27 MMOL/L (ref 20–32)
CREAT SERPL-MCNC: 1.99 MG/DL (ref 0.52–1.04)
CREAT UR-MCNC: 76 MG/DL
FASTING STATUS PATIENT QL REPORTED: ABNORMAL
GFR SERPL CREATININE-BSD FRML MDRD: 27 ML/MIN/1.73M2
GLUCOSE BLD-MCNC: 84 MG/DL (ref 70–99)
HBA1C MFR BLD: 6.8 % (ref 0–5.6)
HDLC SERPL-MCNC: 42 MG/DL
HOLD SPECIMEN: NORMAL
HOLD SPECIMEN: NORMAL
LDLC SERPL CALC-MCNC: 103 MG/DL
LDLC SERPL CALC-MCNC: ABNORMAL MG/DL
MICROALBUMIN UR-MCNC: 159 MG/L
MICROALBUMIN/CREAT UR: 209.21 MG/G CR (ref 0–25)
NONHDLC SERPL-MCNC: 161 MG/DL
POTASSIUM BLD-SCNC: 5.4 MMOL/L (ref 3.4–5.3)
SODIUM SERPL-SCNC: 139 MMOL/L (ref 133–144)
TRIGL SERPL-MCNC: 451 MG/DL

## 2021-12-14 PROCEDURE — 82043 UR ALBUMIN QUANTITATIVE: CPT | Performed by: FAMILY MEDICINE

## 2021-12-14 PROCEDURE — 83036 HEMOGLOBIN GLYCOSYLATED A1C: CPT | Performed by: FAMILY MEDICINE

## 2021-12-14 PROCEDURE — 80048 BASIC METABOLIC PNL TOTAL CA: CPT | Performed by: FAMILY MEDICINE

## 2021-12-14 PROCEDURE — 80061 LIPID PANEL: CPT | Performed by: FAMILY MEDICINE

## 2021-12-14 PROCEDURE — 99207 PR FOOT EXAM NO CHARGE: CPT | Performed by: FAMILY MEDICINE

## 2021-12-14 PROCEDURE — 99214 OFFICE O/P EST MOD 30 MIN: CPT | Performed by: FAMILY MEDICINE

## 2021-12-14 PROCEDURE — 83721 ASSAY OF BLOOD LIPOPROTEIN: CPT | Mod: 59 | Performed by: FAMILY MEDICINE

## 2021-12-14 PROCEDURE — 36415 COLL VENOUS BLD VENIPUNCTURE: CPT | Performed by: FAMILY MEDICINE

## 2021-12-14 RX ORDER — INSULIN GLARGINE 100 [IU]/ML
48 INJECTION, SOLUTION SUBCUTANEOUS AT BEDTIME
Qty: 45 ML | Refills: 3 | Status: SHIPPED | OUTPATIENT
Start: 2021-12-14 | End: 2022-04-14

## 2021-12-14 NOTE — PROGRESS NOTES
Assessment & Plan     Type 2 diabetes mellitus with diabetic neuropathy, with long-term current use of insulin (H), A1C today 6.8  - HEMOGLOBIN A1C  Patient reporting occasional low blood glucose in the morning.    Offered referral to a diabetic educator,  patient declined. States that she has [employer] insurance which is very expensive for her to afford visits, labs and medication.  States that she has good knowledge about diabetes management and checks her blood glucose religiously.  - Decrease dose:  insulin glargine (BASAGLAR KWIKPEN) 100 UNIT/ML pen  Dispense: 45 mL; Refill: 3  - Albumin Random Urine Quantitative with Creat Ratio  - Lipid panel reflex to direct LDL Fasting  - Basic metabolic panel  (Ca, Cl, CO2, Creat, Gluc, K, Na, BUN)  -  Start: metFORMIN (GLUCOPHAGE) 500 MG tablet  Dispense: 180 tablet; Refill: 3  -Continue Jardiance 10 mg daily.  No refills needed at this time.  -May continue sliding scale NovoLog as needed.  - Adult Eye Referral      Stage 3b chronic kidney disease (H)  - Basic metabolic panel  (Ca, Cl, CO2, Creat, Gluc, K, Na, BUN)  - Following with Neprology      Hypertension goal BP (blood pressure) < 140/90, controlled  -  Continue current management.  No refills needed at this time.      Return in about 3 months (around 3/14/2022) for Diabetes Follow Up with a HgbA1C prior to visit.    Aileen Wells MD  [facility] FAWAD Montelongo is a 56 year old who presents for the following health issues     History of Present Illness       Diabetes:   She presents for follow up of diabetes.  She is checking home blood glucose three times daily. She checks blood glucose before and after meals.  Blood glucose is sometimes over 200 and sometimes over 70. She is aware of hypoglycemia symptoms including shakiness, dizziness, weakness and confusion. She is concerned about blood sugar frequently over 200. She is having numbness in feet and burning in feet. The patient has not  had a diabetic eye exam in the last 12 months.         She eats 4 or more servings of fruits and vegetables daily.She consumes 1 sweetened beverage(s) daily.She exercises with enough effort to increase her heart rate 20 to 29 minutes per day.  She exercises with enough effort to increase her heart rate 4 days per week.   She is taking medications regularly.    Currently on Lantus 50 units nightly, NovoLog sliding scale, Metformin 500 mg at supper and Jardiance 10 mg daily.  Tolerating well.  She has a known history of stage III chronic kidney disease, while in hospital her Metformin was discontinued and her nephrologist kept her off of it. States that when she decreased her Metformin dose she felt like her blood sugars were all over the place.  Noticing hypoglycemic episodes in the morning.   Patient reports that she currently has [employer] for insurance which is very expensive for her.    Her most recent renal function tests revealed-   Component      Latest Ref Rng & Units 11/13/2021   Creatinine      0.52 - 1.04 mg/dL 1.67 (H)     Component      Latest Ref Rng & Units 11/13/2021   GFR Estimate      >60 mL/min/1.73m2 33 (L)     HYPERTENSION - Patient has longstanding history of HTN , currently denies any symptoms referable to elevated blood pressure. Specifically denies chest pain, palpitations, dyspnea, orthopnea, PND or peripheral edema. Blood pressure readings have been in normal range. Current medication regimen is as listed below. Patient denies any side effects of medication.         Review of Systems   Constitutional, HEENT, cardiovascular, pulmonary, gi and gu systems are negative, except as otherwise noted.      Objective    /75   Pulse 69   Temp 97.9  F (36.6  C) (Tympanic)   Resp 16   Wt 80.3 kg (177 lb)   SpO2 98%   Breastfeeding No   BMI 30.96 kg/m    Body mass index is 30.96 kg/m .  Physical Exam   GENERAL: healthy, alert and no distress  NECK: no adenopathy, no asymmetry, masses, or scars  and thyroid normal to palpation  RESP: lungs clear to auscultation - no rales, rhonchi or wheezes  CV: regular rate and rhythm, normal S1 S2, no S3 or S4, no murmur, click or rub, no peripheral edema and peripheral pulses strong  ABDOMEN: soft, nontender, no hepatosplenomegaly, no masses and bowel sounds normal  Diabetic foot exam: normal DP and PT pulses, no trophic changes or ulcerative lesions, normal sensory exam and normal monofilament exam, except for findings noted on diabetic foot graphical image.    Left: t # 4, 5, 6  Right:# 4, 5, 6 and 10.   No ulcerations noted.         DATA  Reviewed and discussed with patient prior to discharge.  Results for orders placed or performed in visit on 12/14/21   HEMOGLOBIN A1C     Status: Abnormal   Result Value Ref Range    Hemoglobin A1C 6.8 (H) 0.0 - 5.6 %   Extra Red Top Tube     Status: None   Result Value Ref Range    Hold Specimen [facility]    Extra Green Top (Lithium Heparin) Tube     Status: None   Result Value Ref Range    Hold Specimen [facility]    Extra Tube     Status: None    Narrative    The following orders were created for panel order Extra Tube.  Procedure                               Abnormality         Status                     ---------                               -----------         ------                     Extra Red Top Tube[[id number]]                               Final result               Extra Green Top (Lithium...[[id number]]                      Final result               Extra Purple Top Tube[[id number]]                                                         Please view results for these tests on the individual orders.

## 2021-12-15 ENCOUNTER — TELEPHONE (OUTPATIENT)
Dept: FAMILY MEDICINE | Facility: CLINIC | Age: 56
End: 2021-12-15
Payer: COMMERCIAL

## 2021-12-15 ENCOUNTER — MYC MEDICAL ADVICE (OUTPATIENT)
Dept: FAMILY MEDICINE | Facility: CLINIC | Age: 56
End: 2021-12-15

## 2021-12-15 ENCOUNTER — LAB (OUTPATIENT)
Dept: LAB | Facility: CLINIC | Age: 56
End: 2021-12-15
Payer: COMMERCIAL

## 2021-12-15 DIAGNOSIS — E87.5 HYPERKALEMIA: ICD-10-CM

## 2021-12-15 PROCEDURE — 36415 COLL VENOUS BLD VENIPUNCTURE: CPT

## 2021-12-15 PROCEDURE — 84132 ASSAY OF SERUM POTASSIUM: CPT

## 2021-12-15 RX ORDER — SODIUM POLYSTYRENE SULFONATE 15 G/60ML
15 SUSPENSION ORAL; RECTAL ONCE
Qty: 60 ML | Refills: 0 | Status: SHIPPED | OUTPATIENT
Start: 2021-12-15 | End: 2021-12-15

## 2021-12-15 NOTE — TELEPHONE ENCOUNTER
Called patient and relayed result note, she verbalized understanding. Nurse was able to get patient scheduled at  lab this evening. She did request her medication be sent to [facility] pharmacy, she stated this is the only pharmacy that carries this mediation. Nurse will call to see if they can transfer this prescription over from [facility].     Nurse was able to contact [facility] and they will call to transfer the prescription to them. Notified patient of the pharmacy change.     Hortencia Napier RN

## 2021-12-16 LAB — POTASSIUM BLD-SCNC: 4.9 MMOL/L (ref 3.4–5.3)

## 2021-12-20 ENCOUNTER — TELEPHONE (OUTPATIENT)
Dept: FAMILY MEDICINE | Facility: CLINIC | Age: 56
End: 2021-12-20
Payer: COMMERCIAL

## 2021-12-20 DIAGNOSIS — E11.40 TYPE 2 DIABETES MELLITUS WITH DIABETIC NEUROPATHY, WITH LONG-TERM CURRENT USE OF INSULIN (H): Primary | ICD-10-CM

## 2021-12-20 DIAGNOSIS — Z79.4 TYPE 2 DIABETES MELLITUS WITH DIABETIC NEUROPATHY, WITH LONG-TERM CURRENT USE OF INSULIN (H): Primary | ICD-10-CM

## 2021-12-20 RX ORDER — METFORMIN HCL 500 MG
500 TABLET, EXTENDED RELEASE 24 HR ORAL
Qty: 90 TABLET | Refills: 1 | Status: SHIPPED | OUTPATIENT
Start: 2021-12-20 | End: 2022-01-19 | Stop reason: DRUGHIGH

## 2021-12-20 NOTE — TELEPHONE ENCOUNTER
Per pharmacy:Patient has always taken ER version and was not expecting a change. Please send ER version or clarify why or clarify why we are changing to IR.

## 2021-12-20 NOTE — TELEPHONE ENCOUNTER
Rx changed to metformin XR.  Regular metformin discontinued.     1. Type 2 diabetes mellitus with diabetic neuropathy, with long-term current use of insulin (H)  - metFORMIN (GLUCOPHAGE-XR) 500 MG 24 hr tablet; Take 1 tablet (500 mg) by mouth daily (with dinner)  Dispense: 90 tablet; Refill: 1

## 2021-12-28 DIAGNOSIS — N18.32 CHRONIC KIDNEY DISEASE (CKD) STAGE G3B/A1, MODERATELY DECREASED GLOMERULAR FILTRATION RATE (GFR) BETWEEN 30-44 ML/MIN/1.73 SQUARE METER AND ALBUMINURIA CREATININE RATIO LESS THAN 30 MG/G (H): Primary | ICD-10-CM

## 2022-01-06 ENCOUNTER — LAB (OUTPATIENT)
Dept: LAB | Facility: CLINIC | Age: 57
End: 2022-01-06
Payer: COMMERCIAL

## 2022-01-06 DIAGNOSIS — Z11.59 NEED FOR HEPATITIS C SCREENING TEST: ICD-10-CM

## 2022-01-06 DIAGNOSIS — Z11.4 SCREENING FOR HIV (HUMAN IMMUNODEFICIENCY VIRUS): ICD-10-CM

## 2022-01-06 DIAGNOSIS — N18.32 CHRONIC KIDNEY DISEASE (CKD) STAGE G3B/A1, MODERATELY DECREASED GLOMERULAR FILTRATION RATE (GFR) BETWEEN 30-44 ML/MIN/1.73 SQUARE METER AND ALBUMINURIA CREATININE RATIO LESS THAN 30 MG/G (H): ICD-10-CM

## 2022-01-06 DIAGNOSIS — E87.5 HYPERKALEMIA: ICD-10-CM

## 2022-01-06 DIAGNOSIS — Z13.220 SCREENING FOR HYPERLIPIDEMIA: ICD-10-CM

## 2022-01-06 LAB
ALBUMIN SERPL-MCNC: 3.6 G/DL (ref 3.4–5)
ANION GAP SERPL CALCULATED.3IONS-SCNC: 6 MMOL/L (ref 3–14)
BUN SERPL-MCNC: 42 MG/DL (ref 7–30)
CALCIUM SERPL-MCNC: 10.4 MG/DL (ref 8.5–10.1)
CHLORIDE BLD-SCNC: 105 MMOL/L (ref 94–109)
CO2 SERPL-SCNC: 27 MMOL/L (ref 20–32)
CREAT SERPL-MCNC: 1.71 MG/DL (ref 0.52–1.04)
GFR SERPL CREATININE-BSD FRML MDRD: 34 ML/MIN/1.73M2
GLUCOSE BLD-MCNC: 95 MG/DL (ref 70–99)
PHOSPHATE SERPL-MCNC: 4.5 MG/DL (ref 2.5–4.5)
POTASSIUM BLD-SCNC: 5.4 MMOL/L (ref 3.4–5.3)
POTASSIUM BLD-SCNC: 5.4 MMOL/L (ref 3.4–5.3)
SODIUM SERPL-SCNC: 138 MMOL/L (ref 133–144)

## 2022-01-06 PROCEDURE — 36415 COLL VENOUS BLD VENIPUNCTURE: CPT

## 2022-01-06 PROCEDURE — 80069 RENAL FUNCTION PANEL: CPT

## 2022-01-06 PROCEDURE — 84132 ASSAY OF SERUM POTASSIUM: CPT

## 2022-01-11 ENCOUNTER — LAB (OUTPATIENT)
Dept: LAB | Facility: CLINIC | Age: 57
End: 2022-01-11
Payer: COMMERCIAL

## 2022-01-11 DIAGNOSIS — E87.5 HYPERKALEMIA: ICD-10-CM

## 2022-01-11 LAB — POTASSIUM BLD-SCNC: 5 MMOL/L (ref 3.4–5.3)

## 2022-01-11 PROCEDURE — 36415 COLL VENOUS BLD VENIPUNCTURE: CPT

## 2022-01-11 PROCEDURE — 84132 ASSAY OF SERUM POTASSIUM: CPT

## 2022-01-18 ENCOUNTER — MEDICAL CORRESPONDENCE (OUTPATIENT)
Dept: HEALTH INFORMATION MANAGEMENT | Facility: CLINIC | Age: 57
End: 2022-01-18
Payer: COMMERCIAL

## 2022-01-18 DIAGNOSIS — Z79.4 TYPE 2 DIABETES MELLITUS WITH DIABETIC NEUROPATHY, WITH LONG-TERM CURRENT USE OF INSULIN (H): ICD-10-CM

## 2022-01-18 DIAGNOSIS — E11.40 TYPE 2 DIABETES MELLITUS WITH DIABETIC NEUROPATHY, WITH LONG-TERM CURRENT USE OF INSULIN (H): ICD-10-CM

## 2022-01-18 NOTE — TELEPHONE ENCOUNTER
Routed to PCP to please advise on new mychart request to increase to 2 tabs of Metformin per day.    Patrizia RN,BSN  Triage Nurse  [facility]: [facility]  Ph: [phone]

## 2022-01-19 ENCOUNTER — TELEPHONE (OUTPATIENT)
Dept: FAMILY MEDICINE | Facility: CLINIC | Age: 57
End: 2022-01-19
Payer: COMMERCIAL

## 2022-01-19 DIAGNOSIS — Z79.4 TYPE 2 DIABETES MELLITUS WITH HYPERGLYCEMIA, WITH LONG-TERM CURRENT USE OF INSULIN (H): ICD-10-CM

## 2022-01-19 DIAGNOSIS — E11.65 TYPE 2 DIABETES MELLITUS WITH HYPERGLYCEMIA, WITH LONG-TERM CURRENT USE OF INSULIN (H): ICD-10-CM

## 2022-01-19 DIAGNOSIS — Z79.4 TYPE 2 DIABETES MELLITUS WITH DIABETIC NEUROPATHY, WITH LONG-TERM CURRENT USE OF INSULIN (H): Primary | ICD-10-CM

## 2022-01-19 DIAGNOSIS — E11.40 TYPE 2 DIABETES MELLITUS WITH DIABETIC NEUROPATHY, WITH LONG-TERM CURRENT USE OF INSULIN (H): Primary | ICD-10-CM

## 2022-01-19 RX ORDER — METFORMIN HCL 500 MG
TABLET, EXTENDED RELEASE 24 HR ORAL
Qty: 30 TABLET | Refills: 2 | OUTPATIENT
Start: 2022-01-19

## 2022-01-19 NOTE — TELEPHONE ENCOUNTER
PT WAS TAKING ER FORMULATION BEFORE AND WAS NOT EXPECTING A CHANGE. CAN YOU PLEASE REVIEW?  METFORMIN     [facility] [phone]

## 2022-01-19 NOTE — TELEPHONE ENCOUNTER
Noted.   Yes- this was based on her Nephrologist recommendation- see notes from yesterday # 4 recommending IR versus XR formulation.      4) DM2: Goal hbA1c is less than 7%. Last was 6.6% in September 2021. In this setting, would use immediate release metformin, rather than extended release. We will use a maximum dose of 500 mg twice daily based on her level of renal function. Would recommend cesssation in patients with stage 4 (eGFR <30) or worse CKD.     If patient wishes to go back to once a day dosing XR we can certainly go back to that.

## 2022-01-19 NOTE — TELEPHONE ENCOUNTER
Called patient and relayed the provider's message. Patient is agreeable to the new prescription.     Called pharmacy pharmacy and relayed the message to them. They will fill what was sent in for now.     Hortencia Napier RN

## 2022-01-21 DIAGNOSIS — N18.32 CHRONIC KIDNEY DISEASE (CKD) STAGE G3B/A1, MODERATELY DECREASED GLOMERULAR FILTRATION RATE (GFR) BETWEEN 30-44 ML/MIN/1.73 SQUARE METER AND ALBUMINURIA CREATININE RATIO LESS THAN 30 MG/G (H): Primary | ICD-10-CM

## 2022-01-21 DIAGNOSIS — I10 HYPERPIESIS: ICD-10-CM

## 2022-01-21 DIAGNOSIS — E87.5 HYPERPOTASSEMIA: ICD-10-CM

## 2022-01-27 DIAGNOSIS — D64.9 MILD ANEMIA: ICD-10-CM

## 2022-01-30 NOTE — TELEPHONE ENCOUNTER
Routing refill request to provider for review/approval because:  Labs not current:  No results found for: WBC  No results found for: RBC  No results found for: HGB  No results found for: HCT  No components found for: MCT  No results found for: MCV  No results found for: MCH  No results found for: MCHC  No results found for: RDW  No results found for: PLT

## 2022-02-02 RX ORDER — FERROUS SULFATE 325(65) MG
TABLET ORAL
Qty: 30 TABLET | Refills: 0 | Status: SHIPPED | OUTPATIENT
Start: 2022-02-02 | End: 2022-02-16

## 2022-02-08 ENCOUNTER — LAB (OUTPATIENT)
Dept: LAB | Facility: CLINIC | Age: 57
End: 2022-02-08
Payer: COMMERCIAL

## 2022-02-08 DIAGNOSIS — I10 HYPERPIESIS: ICD-10-CM

## 2022-02-08 DIAGNOSIS — N18.32 CHRONIC KIDNEY DISEASE (CKD) STAGE G3B/A1, MODERATELY DECREASED GLOMERULAR FILTRATION RATE (GFR) BETWEEN 30-44 ML/MIN/1.73 SQUARE METER AND ALBUMINURIA CREATININE RATIO LESS THAN 30 MG/G (H): ICD-10-CM

## 2022-02-08 DIAGNOSIS — E87.5 HYPERPOTASSEMIA: ICD-10-CM

## 2022-02-08 LAB
ALBUMIN SERPL-MCNC: 3.5 G/DL (ref 3.4–5)
ANION GAP SERPL CALCULATED.3IONS-SCNC: 4 MMOL/L (ref 3–14)
BUN SERPL-MCNC: 48 MG/DL (ref 7–30)
CALCIUM SERPL-MCNC: 9.7 MG/DL (ref 8.5–10.1)
CHLORIDE BLD-SCNC: 105 MMOL/L (ref 94–109)
CO2 SERPL-SCNC: 30 MMOL/L (ref 20–32)
CREAT SERPL-MCNC: 1.76 MG/DL (ref 0.52–1.04)
GFR SERPL CREATININE-BSD FRML MDRD: 33 ML/MIN/1.73M2
GLUCOSE BLD-MCNC: 87 MG/DL (ref 70–99)
PHOSPHATE SERPL-MCNC: 3.9 MG/DL (ref 2.5–4.5)
POTASSIUM BLD-SCNC: 4.9 MMOL/L (ref 3.4–5.3)
PTH-INTACT SERPL-MCNC: 23 PG/ML (ref 18–80)
SODIUM SERPL-SCNC: 139 MMOL/L (ref 133–144)

## 2022-02-08 PROCEDURE — 80069 RENAL FUNCTION PANEL: CPT

## 2022-02-08 PROCEDURE — 36415 COLL VENOUS BLD VENIPUNCTURE: CPT

## 2022-02-08 PROCEDURE — 83970 ASSAY OF PARATHORMONE: CPT

## 2022-02-14 DIAGNOSIS — D64.9 MILD ANEMIA: ICD-10-CM

## 2022-02-15 NOTE — TELEPHONE ENCOUNTER
"Requested Prescriptions   Pending Prescriptions Disp Refills    ferrous sulfate (FEROSUL) 325 (65 Fe) MG tablet [Pharmacy Med Name: FERROUS SULFATE 325 MG TABLET] 30 tablet 0     Sig: TAKE 1 TABLET (325 MG) BY MOUTH ONCE DAILY WITH A MEAL.        Iron Supplements Failed - 2/14/2022 12:55 PM        Failed - Hgb OR Hct on record within the past 12 mos.     Patient need only have had a HGB or HCT on file in the past 12 mos. That result does not need to be normal.    No lab results found.  No lab results found.    Please verify a HGB or HCT has been checked SINCE THE LAST DOSE CHANGE.            Passed - Patient is 12 years of age or older        Passed - Recent (12 mo) or future (30 days) visit within the authorizing provider's specialty     Patient has had an office visit with the authorizing provider or a provider within the authorizing providers department within the previous 12 mos or has a future within next 30 days. See \"Patient Info\" tab in inbasket, or \"Choose Columns\" in Meds & Orders section of the refill encounter.              Passed - Medication is active on med list            Routing refill request to provider for review/approval because:  Failed protocol.  Patrizia Riley RN, BSN  Triage nurse  [facility]: [place]    "

## 2022-02-16 RX ORDER — FERROUS SULFATE 325(65) MG
TABLET ORAL
Qty: 30 TABLET | Refills: 0 | Status: SHIPPED | OUTPATIENT
Start: 2022-02-16 | End: 2022-03-15

## 2022-03-01 ENCOUNTER — LAB (OUTPATIENT)
Dept: LAB | Facility: CLINIC | Age: 57
End: 2022-03-01
Payer: COMMERCIAL

## 2022-03-01 DIAGNOSIS — N18.32 CHRONIC KIDNEY DISEASE (CKD) STAGE G3B/A1, MODERATELY DECREASED GLOMERULAR FILTRATION RATE (GFR) BETWEEN 30-44 ML/MIN/1.73 SQUARE METER AND ALBUMINURIA CREATININE RATIO LESS THAN 30 MG/G (H): ICD-10-CM

## 2022-03-01 DIAGNOSIS — E87.5 HYPERKALEMIA: ICD-10-CM

## 2022-03-01 LAB
ALBUMIN UR-MCNC: 30 MG/DL
APPEARANCE UR: CLEAR
BACTERIA #/AREA URNS HPF: ABNORMAL /HPF
BILIRUB UR QL STRIP: NEGATIVE
COLOR UR AUTO: YELLOW
CREAT UR-MCNC: 48 MG/DL
DEPRECATED CALCIDIOL+CALCIFEROL SERPL-MC: 26 UG/L (ref 20–75)
ERYTHROCYTE [DISTWIDTH] IN BLOOD BY AUTOMATED COUNT: 12.6 % (ref 10–15)
GLUCOSE UR STRIP-MCNC: NEGATIVE MG/DL
HCT VFR BLD AUTO: 36.8 % (ref 35–47)
HGB BLD-MCNC: 11.8 G/DL (ref 11.7–15.7)
HGB UR QL STRIP: NEGATIVE
KETONES UR STRIP-MCNC: NEGATIVE MG/DL
LEUKOCYTE ESTERASE UR QL STRIP: NEGATIVE
MCH RBC QN AUTO: 29.9 PG (ref 26.5–33)
MCHC RBC AUTO-ENTMCNC: 32.1 G/DL (ref 31.5–36.5)
MCV RBC AUTO: 93 FL (ref 78–100)
NITRATE UR QL: NEGATIVE
PH UR STRIP: 6.5 [PH] (ref 5–7)
PLATELET # BLD AUTO: 164 10E3/UL (ref 150–450)
POTASSIUM BLD-SCNC: 5.2 MMOL/L (ref 3.4–5.3)
PROT UR-MCNC: 0.24 G/L
PROT/CREAT 24H UR: 0.5 G/G CR (ref 0–0.2)
PTH-INTACT SERPL-MCNC: 22 PG/ML (ref 18–80)
RBC # BLD AUTO: 3.95 10E6/UL (ref 3.8–5.2)
RBC #/AREA URNS AUTO: ABNORMAL /HPF
SP GR UR STRIP: 1.01 (ref 1–1.03)
SQUAMOUS #/AREA URNS AUTO: ABNORMAL /LPF
UROBILINOGEN UR STRIP-ACNC: 0.2 E.U./DL
WBC # BLD AUTO: 5.9 10E3/UL (ref 4–11)
WBC #/AREA URNS AUTO: ABNORMAL /HPF

## 2022-03-01 PROCEDURE — 85027 COMPLETE CBC AUTOMATED: CPT

## 2022-03-01 PROCEDURE — 84132 ASSAY OF SERUM POTASSIUM: CPT

## 2022-03-01 PROCEDURE — 82306 VITAMIN D 25 HYDROXY: CPT

## 2022-03-01 PROCEDURE — 83970 ASSAY OF PARATHORMONE: CPT

## 2022-03-01 PROCEDURE — 84156 ASSAY OF PROTEIN URINE: CPT

## 2022-03-01 PROCEDURE — 81001 URINALYSIS AUTO W/SCOPE: CPT

## 2022-03-01 PROCEDURE — 36415 COLL VENOUS BLD VENIPUNCTURE: CPT

## 2022-03-02 ENCOUNTER — MYC MEDICAL ADVICE (OUTPATIENT)
Dept: FAMILY MEDICINE | Facility: CLINIC | Age: 57
End: 2022-03-02
Payer: COMMERCIAL

## 2022-03-09 NOTE — TELEPHONE ENCOUNTER
Left message on voice mail for patient to call clinic.   [phone]  Jennifer Handy RN  [facility]

## 2022-03-09 NOTE — TELEPHONE ENCOUNTER
Patient returned call. She was confused due to all the lab work that was drawn. She thought this was ordered by Dr Wells and nurse informed her it was not and was from a nephrologist. That is where the confusion was. She said she is feeling off since having COVID and like her blood sugars are low but when she checks they are not. This has been happening for some time now but she is concerned something more is going on with her. She reports weight gain, intermittent edema in her feet and some fatigue. Was able to get patient in sooner.     Hortencia Napier RN

## 2022-03-09 NOTE — TELEPHONE ENCOUNTER
RN- please reach out to this patient to understand what her symptoms or concerns are.   Her recent potassium level 8 days ago was normal.   Component      Latest Ref Rng & Units 3/1/2022   Potassium      3.4 - 5.3 mmol/L 5.2

## 2022-03-12 ASSESSMENT — PATIENT HEALTH QUESTIONNAIRE - PHQ9
10. IF YOU CHECKED OFF ANY PROBLEMS, HOW DIFFICULT HAVE THESE PROBLEMS MADE IT FOR YOU TO DO YOUR WORK, TAKE CARE OF THINGS AT HOME, OR GET ALONG WITH OTHER PEOPLE: VERY DIFFICULT
SUM OF ALL RESPONSES TO PHQ QUESTIONS 1-9: 9
SUM OF ALL RESPONSES TO PHQ QUESTIONS 1-9: 9

## 2022-03-15 ENCOUNTER — OFFICE VISIT (OUTPATIENT)
Dept: FAMILY MEDICINE | Facility: CLINIC | Age: 57
End: 2022-03-15
Payer: COMMERCIAL

## 2022-03-15 VITALS
RESPIRATION RATE: 18 BRPM | HEIGHT: 63 IN | WEIGHT: 189.38 LBS | HEART RATE: 63 BPM | TEMPERATURE: 98.1 F | DIASTOLIC BLOOD PRESSURE: 74 MMHG | SYSTOLIC BLOOD PRESSURE: 118 MMHG | BODY MASS INDEX: 33.55 KG/M2 | OXYGEN SATURATION: 99 %

## 2022-03-15 DIAGNOSIS — Z12.11 COLON CANCER SCREENING: ICD-10-CM

## 2022-03-15 DIAGNOSIS — E87.5 HYPERKALEMIA: ICD-10-CM

## 2022-03-15 DIAGNOSIS — I10 HYPERTENSION GOAL BP (BLOOD PRESSURE) < 140/90: ICD-10-CM

## 2022-03-15 DIAGNOSIS — R53.81 MALAISE: ICD-10-CM

## 2022-03-15 DIAGNOSIS — Z79.4 TYPE 2 DIABETES MELLITUS WITH DIABETIC NEUROPATHY, WITH LONG-TERM CURRENT USE OF INSULIN (H): Primary | ICD-10-CM

## 2022-03-15 DIAGNOSIS — E11.40 TYPE 2 DIABETES MELLITUS WITH DIABETIC NEUROPATHY, WITH LONG-TERM CURRENT USE OF INSULIN (H): Primary | ICD-10-CM

## 2022-03-15 DIAGNOSIS — N18.32 STAGE 3B CHRONIC KIDNEY DISEASE (H): ICD-10-CM

## 2022-03-15 LAB
ALBUMIN SERPL-MCNC: 3.6 G/DL (ref 3.4–5)
ALP SERPL-CCNC: 59 U/L (ref 40–150)
ALT SERPL W P-5'-P-CCNC: 42 U/L (ref 0–50)
AST SERPL W P-5'-P-CCNC: 35 U/L (ref 0–45)
BILIRUB DIRECT SERPL-MCNC: <0.1 MG/DL (ref 0–0.2)
BILIRUB SERPL-MCNC: 0.3 MG/DL (ref 0.2–1.3)
HBA1C MFR BLD: 6.2 % (ref 0–5.6)
HOLD SPECIMEN: NORMAL
HOLD SPECIMEN: NORMAL
PROT SERPL-MCNC: 7.2 G/DL (ref 6.8–8.8)

## 2022-03-15 PROCEDURE — 80076 HEPATIC FUNCTION PANEL: CPT | Performed by: FAMILY MEDICINE

## 2022-03-15 PROCEDURE — 99214 OFFICE O/P EST MOD 30 MIN: CPT | Performed by: FAMILY MEDICINE

## 2022-03-15 PROCEDURE — 83036 HEMOGLOBIN GLYCOSYLATED A1C: CPT | Performed by: FAMILY MEDICINE

## 2022-03-15 PROCEDURE — 36415 COLL VENOUS BLD VENIPUNCTURE: CPT | Performed by: FAMILY MEDICINE

## 2022-03-15 RX ORDER — PREGABALIN 100 MG/1
100 CAPSULE ORAL 2 TIMES DAILY
Qty: 180 CAPSULE | Refills: 3 | Status: CANCELLED | OUTPATIENT
Start: 2022-03-15

## 2022-03-15 RX ORDER — PREGABALIN 150 MG/1
150 CAPSULE ORAL 2 TIMES DAILY
Qty: 60 CAPSULE | Refills: 1 | Status: SHIPPED | OUTPATIENT
Start: 2022-03-15 | End: 2022-04-14

## 2022-03-15 RX ORDER — HYDROCHLOROTHIAZIDE 12.5 MG/1
12.5 TABLET ORAL
COMMUNITY
Start: 2022-01-18 | End: 2024-03-26

## 2022-03-15 ASSESSMENT — PATIENT HEALTH QUESTIONNAIRE - PHQ9
SUM OF ALL RESPONSES TO PHQ QUESTIONS 1-9: 9
10. IF YOU CHECKED OFF ANY PROBLEMS, HOW DIFFICULT HAVE THESE PROBLEMS MADE IT FOR YOU TO DO YOUR WORK, TAKE CARE OF THINGS AT HOME, OR GET ALONG WITH OTHER PEOPLE: VERY DIFFICULT

## 2022-03-15 ASSESSMENT — PAIN SCALES - GENERAL: PAINLEVEL: NO PAIN (0)

## 2022-03-15 NOTE — PROGRESS NOTES
Assessment & Plan     Type 2 diabetes mellitus with diabetic neuropathy, with long-term current use of insulin (H), controlled. A1c 6.2  - HEMOGLOBIN A1C  - Increase: pregabalin (LYRICA) 150 MG capsule  Dispense: 60 capsule; Refill: 1  - Consider discontinuing sliding scale insulin and start a GLP 1A. Patient would like to check with her insurance first in [facility]/[employer] is covered.     Stage 3b chronic kidney disease (H)  - Following with Nephrology    Hypertension goal BP (blood pressure) < 140/90, controlled  - Continue current management.    History of Hyperkalemia  - Recent K+ normal. Monitor periodically.     Malaise  Recent labs reviewed, unremarkable. Will add a hepatic panel  - Hepatic panel (Albumin, ALT, AST, Bili, Alk Phos, TP)  Re-evaluate in 2 weeks after medication changes.     Colon cancer screening  - Adult Gastro Ref - Procedure Only      Return in about 3 months (around 6/15/2022) for Medication check, Diabetes Follow Up with a HgbA1C prior to visit, sooner if needed..    Aileen Wells MD  [facility] FAWAD [place] is a 56 year old who presents for the following health issues     History of Present Illness       Mental Health Follow-up:                    Today's PHQ-9         PHQ-9 Total Score: 9  PHQ-9 Q9 Thoughts of better off dead/self-harm past 2 weeks :   Not at all    How difficult have these problems made it for you to do your work, take care of things at home, or get along with other people: Very difficult        Diabetes:   She presents for follow up of diabetes.  She is checking home blood glucose three times daily. She checks blood glucose before and after meals and at bedtime.  Blood glucose is sometimes over 200 and sometimes over 70. She is aware of hypoglycemia symptoms including shakiness, dizziness and weakness. She is concerned about blood sugar frequently over 200, low blood sugar, several less than 70 in the past few weeks and other. She is  having numbness in feet, burning in feet and weight gain. The patient has had a diabetic eye exam in the last 12 months. Eye exam performed on 03/01/2022. Location of last eye exam [facility].        She eats 2-3 servings of fruits and vegetables daily.She consumes 2 sweetened beverage(s) daily.She exercises with enough effort to increase her heart rate 20 to 29 minutes per day.  She exercises with enough effort to increase her heart rate 5 days per week.   She is taking medications regularly.           Diabetes Follow-up    How often are you checking your blood sugars? Three times daily   What time of day are you checking your blood sugars  Before and after meals    At bedtime   Have you had any blood sugars above 200? Yes   Have you had any blood sugars below 70? Yes   Which symptoms do you notice when your blood sugar is low? Shaky    Dizzy    Weak   What concerns do you have today about your diabetes? Blood sugar is often over 200    Low blood sugar    Other   Do you have any of these symptoms? Numbness in feet    Burning in feet    Weight gain   Have you had a diabetic eye exam within the last year? Yes   Date of last eye exam: 03/01/2022   Where was this eye exam done? [facility]     BP Readings from Last 2 Encounters:   03/15/22 118/74   12/14/21 126/75     Hemoglobin A1C (%)   Date Value   03/15/2022 6.2 (H)   12/14/2021 6.8 (H)     LDL Cholesterol Calculated (no units)   Date Value   12/14/2021      Comment:     Cannot estimate LDL when triglyceride exceeds 400 mg/dL     LDL Cholesterol Direct (mg/dL)   Date Value   12/14/2021 103 (H)         Chronic Kidney Disease Follow-up  Do you take any over the counter pain medicine?: Yes- when has a headache only  What over the counter medicine are you taking for your pain?:  naproxen      How often do you take this medicine?:  A few times a month   Following with Nephrology    History of hyperkalemia-    Recent K+   Component      Latest Ref Rng & Units  "3/1/2022   Potassium      3.4 - 5.3 mmol/L 5.2       States that she feels generally unwell with generalized pain, worsening peripheral neuropathic pain despite taking Lyrica 150 mg/day with some peripheral edema.   Wondering if the dose of the Lyrica can be increased.     Recently had extensive labs done by outside provider on 3/1/2022- see [facility] for details.     HEALTH CARE MAINTENANCE: Due for colon cancer screening.     Review of Systems   Constitutional, HEENT, cardiovascular, pulmonary, gi and gu systems are negative, except as otherwise noted.      Objective    /74   Pulse 63   Temp 98.1  F (36.7  C) (Temporal)   Resp 18   Ht 1.61 m (5' 3.4\")   Wt 85.9 kg (189 lb 6 oz)   SpO2 99%   BMI 33.12 kg/m    Body mass index is 33.12 kg/m .  Physical Exam   GENERAL: healthy, alert and no distress  RESP: lungs clear to auscultation - no rales, rhonchi or wheezes  CV: regular rate and rhythm, normal S1 S2, no S3 or S4, no murmur, click or rub, 1+ peripheral edema and peripheral pulses strong  PSYCH: mentation appears normal, affect normal/bright    DATA  Reviewed and discussed with patient prior to discharge.  Results for orders placed or performed in visit on 03/15/22   HEMOGLOBIN A1C     Status: Abnormal   Result Value Ref Range    Hemoglobin A1C 6.2 (H) 0.0 - 5.6 %   Extra Tube     Status: None    Narrative    The following orders were created for panel order Extra Tube.  Procedure                               Abnormality         Status                     ---------                               -----------         ------                     Extra Red Top Tube[[id number]]                               Final result               Extra Green Top (Lithium...[[id number]]                      Final result               Extra Purple Top Tube[[id number]]                                                         Please view results for these tests on the individual orders.   Extra Red Top Tube     Status: None "   Result Value Ref Range    Hold Specimen [facility]    Extra Green Top (Lithium Heparin) Tube     Status: None   Result Value Ref Range    Hold Specimen [facility]    Hepatic panel (Albumin, ALT, AST, Bili, Alk Phos, TP)     Status: Normal   Result Value Ref Range    Bilirubin Total 0.3 0.2 - 1.3 mg/dL    Bilirubin Direct <0.1 0.0 - 0.2 mg/dL    Protein Total 7.2 6.8 - 8.8 g/dL    Albumin 3.6 3.4 - 5.0 g/dL    Alkaline Phosphatase 59 40 - 150 U/L    AST 35 0 - 45 U/L    ALT 42 0 - 50 U/L

## 2022-04-05 DIAGNOSIS — E78.5 HYPERLIPIDEMIA LDL GOAL <100: ICD-10-CM

## 2022-04-06 RX ORDER — FENOFIBRATE 160 MG/1
TABLET ORAL
Qty: 90 TABLET | Refills: 0 | Status: SHIPPED | OUTPATIENT
Start: 2022-04-06 | End: 2022-04-14

## 2022-04-07 NOTE — TELEPHONE ENCOUNTER
"Prescription approved per [facility] Refill Protocol.  Requested Prescriptions   Pending Prescriptions Disp Refills     fenofibrate (TRIGLIDE/LOFIBRA) 160 MG tablet [Pharmacy Med Name: FENOFIBRATE 160 MG TABLET] 90 tablet 0     Sig: TAKE 1 TABLET (160 MG) BY MOUTH ONCE DAILY WITH A MEAL.       Fibrates Passed - 4/5/2022 12:12 PM        Passed - Lipid panel on file in past 12 months     Recent Labs   Lab Test 12/14/21  1019   CHOL 203*   TRIG 451*   HDL 42*   *   NHDL 161*               Passed - No abnormal creatine kinase in past 12 months     No lab results found.             Passed - Recent (12 mo) or future (30 days) visit within the authorizing provider's specialty     Patient has had an office visit with the authorizing provider or a provider within the authorizing providers department within the previous 12 mos or has a future within next 30 days. See \"Patient Info\" tab in inbasket, or \"Choose Columns\" in Meds & Orders section of the refill encounter.              Passed - Medication is active on med list        Passed - Patient is age 18 or older        Passed - No active pregnancy on record        Passed - No positive pregnancy test in past 12 months             "

## 2022-04-14 ENCOUNTER — VIRTUAL VISIT (OUTPATIENT)
Dept: FAMILY MEDICINE | Facility: CLINIC | Age: 57
End: 2022-04-14
Payer: COMMERCIAL

## 2022-04-14 DIAGNOSIS — M62.830 LUMBAR PARASPINAL MUSCLE SPASM: ICD-10-CM

## 2022-04-14 DIAGNOSIS — E78.5 HYPERLIPIDEMIA LDL GOAL <100: ICD-10-CM

## 2022-04-14 DIAGNOSIS — Z79.4 TYPE 2 DIABETES MELLITUS WITH DIABETIC NEUROPATHY, WITH LONG-TERM CURRENT USE OF INSULIN (H): Primary | ICD-10-CM

## 2022-04-14 DIAGNOSIS — E11.40 TYPE 2 DIABETES MELLITUS WITH DIABETIC NEUROPATHY, WITH LONG-TERM CURRENT USE OF INSULIN (H): Primary | ICD-10-CM

## 2022-04-14 PROCEDURE — 99214 OFFICE O/P EST MOD 30 MIN: CPT | Mod: 95 | Performed by: FAMILY MEDICINE

## 2022-04-14 RX ORDER — PREGABALIN 100 MG/1
100 CAPSULE ORAL 2 TIMES DAILY
Qty: 30 CAPSULE | Refills: 1 | Status: SHIPPED | OUTPATIENT
Start: 2022-04-14 | End: 2022-05-16

## 2022-04-14 RX ORDER — FENOFIBRATE 160 MG/1
TABLET ORAL
Qty: 90 TABLET | Refills: 3 | Status: SHIPPED | OUTPATIENT
Start: 2022-04-14 | End: 2023-11-07

## 2022-04-14 RX ORDER — CYCLOBENZAPRINE HCL 5 MG
5 TABLET ORAL 3 TIMES DAILY PRN
Qty: 30 TABLET | Refills: 1 | Status: SHIPPED | OUTPATIENT
Start: 2022-04-14 | End: 2023-11-07

## 2022-04-14 RX ORDER — INSULIN GLARGINE 100 [IU]/ML
48 INJECTION, SOLUTION SUBCUTANEOUS AT BEDTIME
Qty: 45 ML | Refills: 3 | Status: SHIPPED | OUTPATIENT
Start: 2022-04-14 | End: 2022-09-13

## 2022-04-14 NOTE — PROGRESS NOTES
Jayda is a 56 year old who is being evaluated via a billable telephone visit.      What phone number would you like to be contacted at? [phone]  How would you like to obtain your AVS? MyChart    Assessment & Plan     Type 2 diabetes mellitus with diabetic neuropathy, with long-term current use of insulin (H)  - Was unable to tolerate Trulicity due to side effects- discontinued it. Consider an SGLT2i - Synjardy ( Empagliflozin-Metformin). Patient will research it and get back to me.   In the interim continue current insulin management.   - Refill: insulin glargine (BASAGLAR KWIKPEN) 100 UNIT/ML pen  Dispense: 45 mL; Refill: 3  - insulin pen needle (32G X 4 MM) 32G X 4 MM miscellaneous  Dispense: 200 each; Refill: 0  - Decrease dose due to intolerance of side effects from high dose: pregabalin (LYRICA) 100 MG capsule  Dispense: 30 capsule; Refill: 1  - STATIN NOT PRESCRIBED (INTENTIONAL)    Lumbar paraspinal muscle spasm  - cyclobenzaprine (FLEXERIL) 5 MG tablet  Dispense: 30 tablet; Refill: 1    Hyperlipidemia LDL goal <100  - Unable to tolerate statins  - STATIN NOT PRESCRIBED (INTENTIONAL)  - Refill: fenofibrate (TRIGLIDE/LOFIBRA) 160 MG tablet  Dispense: 90 tablet; Refill: 3      Return in about 1 month (around 5/14/2022) for Medication check.    Aileen Wells MD  [facility] FAWAD Montelongo is a 56 year old who presents for the following health issues     Discuss Plan for Trulicity   She stopped taking this 4 days ago due to her blood sugars increasing... almost doubling.   Also reporting acid reflux, nausea, constipation, and fatigue since taking this medication.   Reports improvement in these symptoms when she stopped the Trulicity.   Willing to try a different diabetes medication.   In the interim resumed previous regimen of insulin.   Blood sugars have been under control. Requesting a refill on Basaglar.     Last A1c-  Component      Latest Ref Rng & Units 3/15/2022    Hemoglobin A1C      0.0 - 5.6 % 6.2 (H)       Requesting a refill on Fenofibrate. Unable to tolerate statins. States that she has tried them all in the past and had severe muscle pains, does not want to try them again.   Last lipid panel-  Recent Labs   Lab Test 12/14/21  1019   CHOL 203*   HDL 42*   *   TRIG 451*       Last renal function tests-    Component      Latest Ref Rng & Units 2/8/2022   Urea Nitrogen      7 - 30 mg/dL 48 (H)   Creatinine      0.52 - 1.04 mg/dL 1.76 (H)     Following with Nephrology.   Would like to check her potassium. Has standing orders on file.     Diabetic Neuropathy-  At the last office visit reported worsening neuropathic pain, Deisy dose was increased but is not able to tolerate the side effects-  dizziness drowsiness & edema.   Would also like to reduce lyrica back down to 100mg 2x/ day.       Chronic Back Pain   Was previously prescribed flexeril 10 mg to PRN  She has a long history of intermittent low back issues and has had a rocedure on her back.   She is reporting recent flare of back pain after she started working out.   Pain is in the lower left region, radiates into sciatic nerve.   Would like a prescription for flexeril for current flare, states that Flexeril has helped in the past.     History of Present Illness       Reason for visit:  New medication review    She eats 2-3 servings of fruits and vegetables daily.She consumes 2 sweetened beverage(s) daily.She exercises with enough effort to increase her heart rate 20 to 29 minutes per day.  She exercises with enough effort to increase her heart rate 5 days per week.   She is taking medications regularly.         Review of Systems   Constitutional, HEENT, cardiovascular, pulmonary, gi and gu systems are negative, except as otherwise noted.      Objective           Vitals:  No vitals were obtained today due to virtual visit.    Physical Exam   alert and no distress  PSYCH: Alert and oriented times 3; coherent  speech, normal   rate and volume, able to articulate logical thoughts, able   to abstract reason, no tangential thoughts, no hallucinations   or delusions  Her affect is pleasant  RESP: No cough, no audible wheezing, able to talk in full sentences  Remainder of exam unable to be completed due to telephone visits    DATA  Recent labs reviewed.           Phone call duration: 18 minutes

## 2022-04-18 ENCOUNTER — LAB (OUTPATIENT)
Dept: LAB | Facility: CLINIC | Age: 57
End: 2022-04-18
Payer: COMMERCIAL

## 2022-04-18 DIAGNOSIS — E87.5 HYPERKALEMIA: ICD-10-CM

## 2022-04-18 DIAGNOSIS — Z11.59 NEED FOR HEPATITIS C SCREENING TEST: ICD-10-CM

## 2022-04-18 DIAGNOSIS — Z11.4 SCREENING FOR HIV (HUMAN IMMUNODEFICIENCY VIRUS): ICD-10-CM

## 2022-04-18 LAB — POTASSIUM BLD-SCNC: 5.1 MMOL/L (ref 3.4–5.3)

## 2022-04-18 PROCEDURE — 84132 ASSAY OF SERUM POTASSIUM: CPT

## 2022-04-18 PROCEDURE — 36415 COLL VENOUS BLD VENIPUNCTURE: CPT

## 2022-04-21 DIAGNOSIS — Z79.4 TYPE 2 DIABETES MELLITUS WITH HYPERGLYCEMIA, WITH LONG-TERM CURRENT USE OF INSULIN (H): ICD-10-CM

## 2022-04-21 DIAGNOSIS — E11.65 TYPE 2 DIABETES MELLITUS WITH HYPERGLYCEMIA, WITH LONG-TERM CURRENT USE OF INSULIN (H): ICD-10-CM

## 2022-04-22 RX ORDER — BLOOD SUGAR DIAGNOSTIC
STRIP MISCELLANEOUS
Qty: 200 STRIP | Refills: 1 | Status: SHIPPED | OUTPATIENT
Start: 2022-04-22 | End: 2022-08-16

## 2022-05-20 ENCOUNTER — LAB (OUTPATIENT)
Dept: LAB | Facility: CLINIC | Age: 57
End: 2022-05-20
Payer: COMMERCIAL

## 2022-05-20 DIAGNOSIS — Z11.4 SCREENING FOR HIV (HUMAN IMMUNODEFICIENCY VIRUS): ICD-10-CM

## 2022-05-20 DIAGNOSIS — E87.5 HYPERKALEMIA: ICD-10-CM

## 2022-05-20 DIAGNOSIS — Z11.59 NEED FOR HEPATITIS C SCREENING TEST: ICD-10-CM

## 2022-05-20 LAB — POTASSIUM BLD-SCNC: 4.8 MMOL/L (ref 3.4–5.3)

## 2022-05-20 PROCEDURE — 84132 ASSAY OF SERUM POTASSIUM: CPT

## 2022-05-20 PROCEDURE — 36415 COLL VENOUS BLD VENIPUNCTURE: CPT

## 2022-07-05 DIAGNOSIS — E11.40 TYPE 2 DIABETES MELLITUS WITH DIABETIC NEUROPATHY, WITH LONG-TERM CURRENT USE OF INSULIN (H): ICD-10-CM

## 2022-07-05 DIAGNOSIS — Z79.4 TYPE 2 DIABETES MELLITUS WITH DIABETIC NEUROPATHY, WITH LONG-TERM CURRENT USE OF INSULIN (H): ICD-10-CM

## 2022-07-05 NOTE — TELEPHONE ENCOUNTER
I don't see novolog (short acting insulin) on Epic med list.    She had phone visit with Dr. Wells 4/14/22 regarding diabetes.       Did patient request novolog?    Attempted to call patient at home number, no answer, left message on voicemail; patient was instructed to return call to [facility] at [phone].    Also called mobile number, patient answered, says she remains on novolog, perhaps med fell off our med list when she was trialing jardiance?    I angi'd up past Rx, she says she is on about 10-12 units TID with meals now.   I advised she is due for a med check; she declines to schedule, says her blood sugars have been good, recent labs good.  Hoping to wait until October for 6 mos diabetes follow up.    Lab Results   Component Value Date    A1C 6.2 03/15/2022    A1C 6.8 12/14/2021     Routed to Dr. Wells to address.   3 mos supply novolog ruslan.      Tracie Palencia RN  [facility]

## 2022-08-06 ENCOUNTER — HEALTH MAINTENANCE LETTER (OUTPATIENT)
Age: 57
End: 2022-08-06

## 2022-08-09 ENCOUNTER — MYC MEDICAL ADVICE (OUTPATIENT)
Dept: FAMILY MEDICINE | Facility: CLINIC | Age: 57
End: 2022-08-09

## 2022-08-09 DIAGNOSIS — Z79.4 TYPE 2 DIABETES MELLITUS WITH HYPERGLYCEMIA, WITH LONG-TERM CURRENT USE OF INSULIN (H): Primary | ICD-10-CM

## 2022-08-09 DIAGNOSIS — E11.65 TYPE 2 DIABETES MELLITUS WITH HYPERGLYCEMIA, WITH LONG-TERM CURRENT USE OF INSULIN (H): Primary | ICD-10-CM

## 2022-08-10 DIAGNOSIS — E11.40 TYPE 2 DIABETES MELLITUS WITH DIABETIC NEUROPATHY, WITH LONG-TERM CURRENT USE OF INSULIN (H): ICD-10-CM

## 2022-08-10 DIAGNOSIS — Z79.4 TYPE 2 DIABETES MELLITUS WITH DIABETIC NEUROPATHY, WITH LONG-TERM CURRENT USE OF INSULIN (H): ICD-10-CM

## 2022-08-10 NOTE — TELEPHONE ENCOUNTER
Routing refill request to provider for review/approval because:  Drug not on the [facility] refill protocol     Lisa Rivas RN BSN  [facility]

## 2022-08-11 RX ORDER — PREGABALIN 100 MG/1
CAPSULE ORAL
Qty: 30 CAPSULE | Refills: 1 | Status: SHIPPED | OUTPATIENT
Start: 2022-08-11 | End: 2022-10-11

## 2022-08-24 ENCOUNTER — LAB (OUTPATIENT)
Dept: LAB | Facility: CLINIC | Age: 57
End: 2022-08-24
Payer: COMMERCIAL

## 2022-08-24 DIAGNOSIS — E87.5 HYPERKALEMIA: ICD-10-CM

## 2022-08-24 PROCEDURE — 84132 ASSAY OF SERUM POTASSIUM: CPT

## 2022-08-24 PROCEDURE — 36415 COLL VENOUS BLD VENIPUNCTURE: CPT

## 2022-08-25 LAB — POTASSIUM BLD-SCNC: 4.9 MMOL/L (ref 3.4–5.3)

## 2022-09-12 ENCOUNTER — NURSE TRIAGE (OUTPATIENT)
Dept: NURSING | Facility: CLINIC | Age: 57
End: 2022-09-12

## 2022-09-12 NOTE — TELEPHONE ENCOUNTER
Covid screening completed    Type 2 diabetic.  BS 33 in night and ambulance came to evaluate her.    No current symptoms.  BS 91, little thirsty.    Was told to follow up with pcp and calling to schedule that visit.    Lots of stress recently.    Patient in [place] today.    Care advice given per protocol recommendations. Caller verbalizes understanding    Warm transferred to scheduling    Rocio Campbell RN  [employer] Nurse Advisor    Reason for Disposition    Patient wants to be seen    Additional Information    Negative: Unconscious or difficult to awaken    Negative: Seizure occurs    Negative: Acting confused (e.g., disoriented, slurred speech)    Negative: Very weak (can't stand)    Negative: Sounds like a life-threatening emergency to the triager    Negative: Vomiting and signs of dehydration (e.g., very dry mouth, lightheaded, dark urine, etc.)    Negative: Low blood sugar symptoms persist > 30 minutes AND using low blood sugar Care Advice    Negative: Low blood glucose (< 70 mg/dL or 3.9 mmol/L) persists > 30 minutes AND using low blood sugar Care Advice    Negative: Patient sounds very sick or weak to the triager    Negative: Diabetes medication overdose (e.g., insulin error) and triager unable to answer question    Negative: Caller has URGENT medication or insulin pump question and triager unable to answer question    Negative: Low blood sugar symptoms with no other adult present AND hasn't tried Care Advice    Negative: Low blood glucose (< 70 mg/dL or 3.9 mmol/L) with no other adult present AND hasn't tried Care Advice    Negative: Low blood glucose (< 70 mg/dL or 3.9 mmol/L) or symptomatic, now improved with Care Advice AND cause unknown    Protocols used: DIABETES - LOW BLOOD SUGAR-A-OH

## 2022-09-13 ENCOUNTER — OFFICE VISIT (OUTPATIENT)
Dept: FAMILY MEDICINE | Facility: CLINIC | Age: 57
End: 2022-09-13
Payer: COMMERCIAL

## 2022-09-13 VITALS
TEMPERATURE: 98.4 F | HEIGHT: 62 IN | HEART RATE: 82 BPM | SYSTOLIC BLOOD PRESSURE: 124 MMHG | RESPIRATION RATE: 18 BRPM | BODY MASS INDEX: 33.13 KG/M2 | WEIGHT: 180 LBS | DIASTOLIC BLOOD PRESSURE: 72 MMHG | OXYGEN SATURATION: 100 %

## 2022-09-13 DIAGNOSIS — Z79.4 TYPE 2 DIABETES MELLITUS WITH DIABETIC NEUROPATHY, WITH LONG-TERM CURRENT USE OF INSULIN (H): Primary | ICD-10-CM

## 2022-09-13 DIAGNOSIS — D64.9 LOW HEMOGLOBIN: ICD-10-CM

## 2022-09-13 DIAGNOSIS — Z12.11 SCREEN FOR COLON CANCER: ICD-10-CM

## 2022-09-13 DIAGNOSIS — R06.81 WITNESSED APNEIC SPELLS: ICD-10-CM

## 2022-09-13 DIAGNOSIS — E11.40 TYPE 2 DIABETES MELLITUS WITH DIABETIC NEUROPATHY, WITH LONG-TERM CURRENT USE OF INSULIN (H): Primary | ICD-10-CM

## 2022-09-13 DIAGNOSIS — R06.83 SNORING: ICD-10-CM

## 2022-09-13 LAB
ERYTHROCYTE [DISTWIDTH] IN BLOOD BY AUTOMATED COUNT: 12.6 % (ref 10–15)
GLUCOSE BLD-MCNC: 94 MG/DL (ref 60–99)
HBA1C MFR BLD: 5.8 % (ref 0–5.6)
HCT VFR BLD AUTO: 33.2 % (ref 35–47)
HGB BLD-MCNC: 10.9 G/DL (ref 11.7–15.7)
MCH RBC QN AUTO: 29.3 PG (ref 26.5–33)
MCHC RBC AUTO-ENTMCNC: 32.8 G/DL (ref 31.5–36.5)
MCV RBC AUTO: 89 FL (ref 78–100)
PLATELET # BLD AUTO: 191 10E3/UL (ref 150–450)
RBC # BLD AUTO: 3.72 10E6/UL (ref 3.8–5.2)
WBC # BLD AUTO: 7.7 10E3/UL (ref 4–11)

## 2022-09-13 PROCEDURE — 83550 IRON BINDING TEST: CPT | Performed by: NURSE PRACTITIONER

## 2022-09-13 PROCEDURE — 83036 HEMOGLOBIN GLYCOSYLATED A1C: CPT | Performed by: NURSE PRACTITIONER

## 2022-09-13 PROCEDURE — 82728 ASSAY OF FERRITIN: CPT | Performed by: NURSE PRACTITIONER

## 2022-09-13 PROCEDURE — 83540 ASSAY OF IRON: CPT | Performed by: NURSE PRACTITIONER

## 2022-09-13 PROCEDURE — 85027 COMPLETE CBC AUTOMATED: CPT | Performed by: NURSE PRACTITIONER

## 2022-09-13 PROCEDURE — 82947 ASSAY GLUCOSE BLOOD QUANT: CPT | Performed by: NURSE PRACTITIONER

## 2022-09-13 PROCEDURE — 80048 BASIC METABOLIC PNL TOTAL CA: CPT | Performed by: NURSE PRACTITIONER

## 2022-09-13 PROCEDURE — 36415 COLL VENOUS BLD VENIPUNCTURE: CPT | Performed by: NURSE PRACTITIONER

## 2022-09-13 PROCEDURE — 99214 OFFICE O/P EST MOD 30 MIN: CPT | Performed by: NURSE PRACTITIONER

## 2022-09-13 RX ORDER — INSULIN GLARGINE 100 [IU]/ML
25 INJECTION, SOLUTION SUBCUTANEOUS AT BEDTIME
Qty: 45 ML | Refills: 3 | COMMUNITY
Start: 2022-09-13 | End: 2023-04-06

## 2022-09-13 NOTE — PROGRESS NOTES
"  Assessment & Plan     Jayda was seen today for recheck.    Diagnoses and all orders for this visit:    Type 2 diabetes mellitus with diabetic neuropathy, with long-term current use of insulin (H)  Hemoglobin A1C   Date Value Ref Range Status   09/13/2022 5.8 (H) 0.0 - 5.6 % Final     Comment:     Normal <5.7%   Prediabetes 5.7-6.4%    Diabetes 6.5% or higher     Note: Adopted from ADA consensus guidelines.   03/15/2022 6.2 (H) 0.0 - 5.6 % Final     Comment:     Normal <5.7%   Prediabetes 5.7-6.4%    Diabetes 6.5% or higher     Note: Adopted from ADA consensus guidelines.   12/14/2021 6.8 (H) 0.0 - 5.6 % Final     Comment:     Normal <5.7%   Prediabetes 5.7-6.4%    Diabetes 6.5% or higher     Note: Adopted from ADA consensus guidelines.     Concern about excessive basal insulin regimen.  Recommend decreasing Basaglar to 40 units daily (previously on 48 units and patient had self decreased insulin to 44 units with persistent hypoglycemia) and decrease Novolog to 8-10 units per meal (previously on 10-12 units/meal).    Close monitoring of fasting, post-prandial and evening blood sugars.   Follow-up in 1 month, sooner as needed - bring glucose readings to follow-up visit.      -     Albumin Random Urine Quantitative with Creat Ratio; Future  -     HEMOGLOBIN A1C  -     Glucose, whole blood  -     Basic metabolic panel  (Ca, Cl, CO2, Creat, Gluc, K, Na, BUN)  -     CBC with platelets    Low hemoglobin  -     Ferritin  -     Iron & Iron Binding Capacity    Snoring  Witnessed apneic spells  -     Adult Sleep Eval & Management  Referral; Future    Screen for colon cancer  -     Colonoscopy Screening  Referral; Future      30 minutes spent on the date of the encounter doing chart review, history and exam, documentation and further activities per the note  {  BMI:   Estimated body mass index is 32.92 kg/m  as calculated from the following:    Height as of this encounter: 1.575 m (5' 2\").    Weight as of " this encounter: 81.6 kg (180 lb).     Return in about 4 weeks (around 10/11/2022) for Med check, Diabetes Follow-up, In Clinic.      KEM Adams CNP  [facility] [place]          Alvarez Montelongo is a 57 year old, presenting for the following health issues:  RECHECK (From EMT visit x 2 days ago)      HPI     Recently moved to [facility] from EMT visit 9/11/22.    Nurse spoke via phone yesterday with patient.    Type 2 diabetic.  BS 33 in night and ambulance came to evaluate her.   found her face down in the corner of the bedroom, which is why EMT was called.  No current symptoms.  BS 91, little thirsty.  Last night took her blood sugar- woke up at night to use the bathroom- was at 64. Has been stable for several years.     Lots of stress recently.      Diabetes Follow-up  Hemoglobin A1C   Date Value Ref Range Status   09/13/2022 5.8 (H) 0.0 - 5.6 % Final     Comment:     Normal <5.7%   Prediabetes 5.7-6.4%    Diabetes 6.5% or higher     Note: Adopted from ADA consensus guidelines.   03/15/2022 6.2 (H) 0.0 - 5.6 % Final     Comment:     Normal <5.7%   Prediabetes 5.7-6.4%    Diabetes 6.5% or higher     Note: Adopted from ADA consensus guidelines.   12/14/2021 6.8 (H) 0.0 - 5.6 % Final     Comment:     Normal <5.7%   Prediabetes 5.7-6.4%    Diabetes 6.5% or higher     Note: Adopted from ADA consensus guidelines.     Last night:    12:30 a.m. - blood sugar was 64.   Took 4 glucose tablets.   Checked again one hour later - was at 72  Took 1 more glucose tablet, had a sandwich and glass of milk.   7:30 a.m. - 109 blood glucose.  8:30 a.m. - 107     Gave 44 units last night before bed.   Normal meals, no dietary.   No recent illness.     4 carb meal 1.5 hours ago.      Neuropathy has worsened, waking up with intermittent headaches and not sleeping as well.   Eating slightly less.    No change in exercise - difficulty with neuropathy.    No recent hyperglycemia.      Recent  "move, father  suddenly and son is getting  soon.   Quit job recently.      How often are you checking your blood sugar? Two times daily  What time of day are you checking your blood sugars (select all that apply)?  Before and after meals  Have you had any blood sugars above 200?  No    Have you had any blood sugars below 70?  Yes     What symptoms do you notice when your blood sugar is low?  Shaky, lips have been tingling, confused and crabby     What concerns do you have today about your diabetes? None and Low blood sugar     Do you have any of these symptoms? (Select all that apply)  Excessive thirst and Blurry vision, has neuropathy     +Gasping for air per .  +Snoring.    +Daytime fatigue.      BP Readings from Last 2 Encounters:   22 124/72   03/15/22 118/74     Hemoglobin A1C (%)   Date Value   2022 5.8 (H)   03/15/2022 6.2 (H)     LDL Cholesterol Calculated (no units)   Date Value   2021      Comment:     Cannot estimate LDL when triglyceride exceeds 400 mg/dL     LDL Cholesterol Direct (mg/dL)   Date Value   2021 103 (H)         Review of Systems   Constitutional, HEENT, cardiovascular, pulmonary, gi and gu systems are negative, except as otherwise noted.      Objective    /72   Pulse 82   Temp 98.4  F (36.9  C)   Resp 18   Ht 1.575 m (5' 2\")   Wt 81.6 kg (180 lb)   SpO2 100%   BMI 32.92 kg/m    Body mass index is 32.92 kg/m .     Physical Exam     GENERAL: healthy, alert and no distress  EYES: Eyes grossly normal to inspection  RESP: lungs clear to auscultation - no rales, rhonchi or wheezes  CV: regular rate and rhythm, normal S1 S2, no S3 or S4, no murmur  MS: no gross musculoskeletal defects noted, no edema  SKIN: no suspicious lesions or rashes  NEURO: Normal strength and tone, mentation intact and speech normal  PSYCH: mentation appears normal, affect normal/bright            "

## 2022-09-14 LAB
ANION GAP SERPL CALCULATED.3IONS-SCNC: 12 MMOL/L (ref 3–14)
BUN SERPL-MCNC: 48 MG/DL (ref 7–30)
CALCIUM SERPL-MCNC: 9.3 MG/DL (ref 8.5–10.1)
CHLORIDE BLD-SCNC: 102 MMOL/L (ref 94–109)
CO2 SERPL-SCNC: 20 MMOL/L (ref 20–32)
CREAT SERPL-MCNC: 1.91 MG/DL (ref 0.52–1.04)
FERRITIN SERPL-MCNC: 78 NG/ML (ref 8–252)
GFR SERPL CREATININE-BSD FRML MDRD: 30 ML/MIN/1.73M2
GLUCOSE BLD-MCNC: 115 MG/DL (ref 70–99)
IRON BINDING CAPACITY (ROCHE): 498 UG/DL (ref 240–430)
IRON SATN MFR SERPL: 15 % (ref 15–46)
IRON SERPL-MCNC: 73 UG/DL (ref 37–145)
POTASSIUM BLD-SCNC: 4.1 MMOL/L (ref 3.4–5.3)
SODIUM SERPL-SCNC: 134 MMOL/L (ref 133–144)

## 2022-09-15 ENCOUNTER — MYC MEDICAL ADVICE (OUTPATIENT)
Dept: FAMILY MEDICINE | Facility: CLINIC | Age: 57
End: 2022-09-15

## 2022-09-15 DIAGNOSIS — Z79.4 TYPE 2 DIABETES MELLITUS WITH DIABETIC NEUROPATHY, WITH LONG-TERM CURRENT USE OF INSULIN (H): Primary | ICD-10-CM

## 2022-09-15 DIAGNOSIS — E11.40 TYPE 2 DIABETES MELLITUS WITH DIABETIC NEUROPATHY, WITH LONG-TERM CURRENT USE OF INSULIN (H): Primary | ICD-10-CM

## 2022-09-16 NOTE — TELEPHONE ENCOUNTER
My chart message sent     Rocio Zeng RN, BSN  [facility]

## 2022-09-16 NOTE — RESULT ENCOUNTER NOTE
Dear Jayda,     -Hemoglobin is decreased and I would like to recheck this at follow-up.    -White blood cell and platelet counts are normal.  -Kidney function (GFR) is decreased.  ADVISE: rechecking at follow-up visit.    -Sodium is normal.  -Potassium is normal.  -Calcium is normal.  -Glucose is just slightly elevated due to your diabetes.  -Ferritin (iron) level is normal.  -Total iron saturation levels are reassuring.        Please send a [employer] message or call [phone]  if you have any questions.      Jessy García, APRN, CNP  [facility] [place]    If you have further questions about the interpretation of your labs, [email or web address] is a good website to check out for further information.

## 2022-09-19 NOTE — TELEPHONE ENCOUNTER
Please see my chart message below     Please review and advise     Thank you     Rocio Zeng RN, BSN  [facility]

## 2022-09-19 NOTE — TELEPHONE ENCOUNTER
My chart message sent     Awaiting reply     Rocio Zeng RN, BSN  [facility]

## 2022-09-19 NOTE — TELEPHONE ENCOUNTER
Please reach out to patient to assist with scheduling appointment in clinic this week.      Due to persistent hypoglycemia we need her to be seen sooner for recheck and follow-up labs/recheck of kidney function.     Unfortunately I will be out of clinic this week.  Please schedule with a colleague.      - Guillermo, CNP

## 2022-09-27 DIAGNOSIS — I10 HYPERTENSION GOAL BP (BLOOD PRESSURE) < 140/90: ICD-10-CM

## 2022-09-28 RX ORDER — CARVEDILOL 12.5 MG/1
TABLET ORAL
Qty: 60 TABLET | Refills: 8 | OUTPATIENT
Start: 2022-09-28

## 2022-09-30 ENCOUNTER — MYC MEDICAL ADVICE (OUTPATIENT)
Dept: FAMILY MEDICINE | Facility: CLINIC | Age: 57
End: 2022-09-30

## 2022-09-30 DIAGNOSIS — I10 HYPERTENSION GOAL BP (BLOOD PRESSURE) < 140/90: ICD-10-CM

## 2022-10-03 RX ORDER — CARVEDILOL 12.5 MG/1
12.5 TABLET ORAL 2 TIMES DAILY WITH MEALS
Qty: 60 TABLET | Refills: 0 | Status: SHIPPED | OUTPATIENT
Start: 2022-10-03 | End: 2022-10-11

## 2022-10-03 NOTE — TELEPHONE ENCOUNTER
Patient now has care here at -- has an appointment with Jessy on 10/11 ( had an appt on 9/20 with Claudine Najera already.)  She needs her carvedilol refilled, last pill she has is for today       Deana Levine

## 2022-10-11 ENCOUNTER — OFFICE VISIT (OUTPATIENT)
Dept: FAMILY MEDICINE | Facility: CLINIC | Age: 57
End: 2022-10-11
Payer: COMMERCIAL

## 2022-10-11 VITALS
DIASTOLIC BLOOD PRESSURE: 72 MMHG | HEART RATE: 78 BPM | OXYGEN SATURATION: 99 % | WEIGHT: 179 LBS | TEMPERATURE: 97.5 F | BODY MASS INDEX: 32.74 KG/M2 | SYSTOLIC BLOOD PRESSURE: 120 MMHG

## 2022-10-11 DIAGNOSIS — E11.40 TYPE 2 DIABETES MELLITUS WITH DIABETIC NEUROPATHY, WITH LONG-TERM CURRENT USE OF INSULIN (H): Primary | ICD-10-CM

## 2022-10-11 DIAGNOSIS — R06.81 WITNESSED APNEIC SPELLS: ICD-10-CM

## 2022-10-11 DIAGNOSIS — E11.42 DIABETIC PERIPHERAL NEUROPATHY (H): ICD-10-CM

## 2022-10-11 DIAGNOSIS — Z90.710 S/P HYSTERECTOMY: ICD-10-CM

## 2022-10-11 DIAGNOSIS — I10 HYPERTENSION GOAL BP (BLOOD PRESSURE) < 140/90: ICD-10-CM

## 2022-10-11 DIAGNOSIS — Z79.4 TYPE 2 DIABETES MELLITUS WITH DIABETIC NEUROPATHY, WITH LONG-TERM CURRENT USE OF INSULIN (H): Primary | ICD-10-CM

## 2022-10-11 DIAGNOSIS — N18.32 STAGE 3B CHRONIC KIDNEY DISEASE (H): ICD-10-CM

## 2022-10-11 LAB
ANION GAP SERPL CALCULATED.3IONS-SCNC: 7 MMOL/L (ref 3–14)
BUN SERPL-MCNC: 38 MG/DL (ref 7–30)
CALCIUM SERPL-MCNC: 8.7 MG/DL (ref 8.5–10.1)
CHLORIDE BLD-SCNC: 104 MMOL/L (ref 94–109)
CO2 SERPL-SCNC: 26 MMOL/L (ref 20–32)
CREAT SERPL-MCNC: 1.6 MG/DL (ref 0.52–1.04)
CREAT UR-MCNC: 96 MG/DL
ERYTHROCYTE [DISTWIDTH] IN BLOOD BY AUTOMATED COUNT: 12.7 % (ref 10–15)
GFR SERPL CREATININE-BSD FRML MDRD: 37 ML/MIN/1.73M2
GLUCOSE BLD-MCNC: 170 MG/DL (ref 70–99)
HCT VFR BLD AUTO: 33.8 % (ref 35–47)
HGB BLD-MCNC: 11.3 G/DL (ref 11.7–15.7)
MCH RBC QN AUTO: 30.2 PG (ref 26.5–33)
MCHC RBC AUTO-ENTMCNC: 33.4 G/DL (ref 31.5–36.5)
MCV RBC AUTO: 90 FL (ref 78–100)
MICROALBUMIN UR-MCNC: 96 MG/L
MICROALBUMIN/CREAT UR: 100 MG/G CR (ref 0–25)
PLATELET # BLD AUTO: 209 10E3/UL (ref 150–450)
POTASSIUM BLD-SCNC: 4.6 MMOL/L (ref 3.4–5.3)
RBC # BLD AUTO: 3.74 10E6/UL (ref 3.8–5.2)
SODIUM SERPL-SCNC: 137 MMOL/L (ref 133–144)
WBC # BLD AUTO: 6.9 10E3/UL (ref 4–11)

## 2022-10-11 PROCEDURE — 36415 COLL VENOUS BLD VENIPUNCTURE: CPT | Performed by: NURSE PRACTITIONER

## 2022-10-11 PROCEDURE — 82043 UR ALBUMIN QUANTITATIVE: CPT | Performed by: NURSE PRACTITIONER

## 2022-10-11 PROCEDURE — 99214 OFFICE O/P EST MOD 30 MIN: CPT | Performed by: NURSE PRACTITIONER

## 2022-10-11 PROCEDURE — 80048 BASIC METABOLIC PNL TOTAL CA: CPT | Performed by: NURSE PRACTITIONER

## 2022-10-11 PROCEDURE — 85027 COMPLETE CBC AUTOMATED: CPT | Performed by: NURSE PRACTITIONER

## 2022-10-11 RX ORDER — CARVEDILOL 12.5 MG/1
12.5 TABLET ORAL 2 TIMES DAILY WITH MEALS
Qty: 60 TABLET | Refills: 5 | Status: SHIPPED | OUTPATIENT
Start: 2022-10-11 | End: 2023-11-07

## 2022-10-11 RX ORDER — MAGNESIUM OXIDE 400 MG/1
400 TABLET ORAL DAILY
COMMUNITY
End: 2023-11-07

## 2022-10-11 RX ORDER — AMITRIPTYLINE HYDROCHLORIDE 10 MG/1
TABLET ORAL
Qty: 60 TABLET | Refills: 1 | Status: SHIPPED | OUTPATIENT
Start: 2022-10-11 | End: 2022-11-08

## 2022-10-11 NOTE — RESULT ENCOUNTER NOTE
Dear Jayda,     -Red blood cell (hgb) levels are stable and improved from one month ago, normal white blood cell count and normal platelet levels.      Please send a [employer] message or call [phone]  if you have any questions.      Jessy García, KEM, CNP  [facility]    If you have further questions about the interpretation of your labs, [email or web address] is a good website to check out for further information.

## 2022-10-11 NOTE — PROGRESS NOTES
Assessment & Plan     Jayda was seen today for recheck medication.     Diagnoses and all orders for this visit:    Type 2 diabetes mellitus with diabetic neuropathy, with long-term current use of insulin (H)  Hemoglobin A1C   Date Value Ref Range Status   09/13/2022 5.8 (H) 0.0 - 5.6 % Final     Comment:     Normal <5.7%   Prediabetes 5.7-6.4%    Diabetes 6.5% or higher     Note: Adopted from ADA consensus guidelines.   03/15/2022 6.2 (H) 0.0 - 5.6 % Final     Comment:     Normal <5.7%   Prediabetes 5.7-6.4%    Diabetes 6.5% or higher     Note: Adopted from ADA consensus guidelines.   12/14/2021 6.8 (H) 0.0 - 5.6 % Final     Comment:     Normal <5.7%   Prediabetes 5.7-6.4%    Diabetes 6.5% or higher     Note: Adopted from ADA consensus guidelines.     Currently on Basaglar 25 units at bedtime, 8-10 units of Novolog with each meal, Metformin 500 mg twice daily.    Encouraged consultation with endocrinology.    Await renal results, consider initiation of SGLT-Inhibitor for DM management and history of CKD.      -     Adult Endocrinology  Referral; Future   -     Albumin Random Urine Quantitative with Creat Ratio              -will recheck a1c in 2 mo at f/u     Stage 3b chronic kidney disease (H)   - Reengage with nephrology and cease NSAID use.   -     CBC with platelets; Future  -     Basic metabolic panel  (Ca, Cl, CO2, Creat, Gluc, K, Na, BUN); Future    Witnessed apneic spells  -     Adult Sleep Eval & Management  Referral; Future    Diabetic peripheral neuropathy (H)  - Pt does not wish to resume gabapentin/lyrica at this time. Would like to resume amitriptyline for this winter. Will reassess effectiveness at recheck  -     amitriptyline (ELAVIL) 10 MG tablet; Take 1 tablet (10 mg) by mouth at bedtime for 7 days and then increase to 2 tablets (20 mg) by mouth at bedtime.    Hypertension goal BP (blood pressure) < 140/90- refilled   -     carvedilol (COREG) 12.5 MG tablet; Take 1 tablet (12.5  "mg) by mouth 2 times daily (with meals)        BMI:   Estimated body mass index is 32.74 kg/m  as calculated from the following:    Height as of 9/13/22: 1.575 m (5' 2\").    Weight as of this encounter: 81.2 kg (179 lb).       Return in about 2 months (around 12/11/2022) for Diabetes Follow-up.      Baljit Milan RN, FNP Student     I was present with the student who participated in the service and in the documentation of the note, which I have reviewed and verified. The history, reviews of systems, objective data, and assessment/plan were completed by myself.    Jessy García, KEM [facility]            Alvarez Montelongo is a 57 year old, presenting for the following health issues:  Recheck Medication (DM check/)    Last seen 13 SEP for medication adjustment 2/2 hypoglycemic episode. Since then, pt has reduced PM insulin glargine to 25u HS. This has resulted in no further hypoglycemic episodes. Reviewed pts written BG log since 13 SEP, BG range from a low of 77 to a high of 350 (X1 event post ice cream) with an average of 120s - 140s.     Pt is acutely concerned for neuropathic pain in feet bilat that greatly disturbs sleep. She regularly takes 'aleve' and melatonin for this with minimal effect. She has previously taken gabapentin and lyrica with intolerable ADR (brain fog, swelling, and balance issues). Has also tried amitriptyline with good effect, but was intolerable in summer due to photosensitivity.     Pt also described witnessed apneic events by  and loud snoring. Was previously ordered a sleep study but was never contacted.     Pt last saw nephrology 18JAN 2022 (dr. fuller)- in brief summary-   1) CKD3 \"Likely from DM neuropathy\" recommendations were to focus on BP/BG control. Begin SGLT2. No ACE/ARB. Cease NSAID use.   2) hyperkalemia \"multifactorial, possible type IV RTA from CKD/DM. Intolerant to diuretic in the past\" Recommendations hydrochlorothiazide 12.5 and " low K diet.       History of Present Illness       Diabetes:   She presents for follow up of diabetes.  She is checking home blood glucose four or more times daily. She checks blood glucose before meals, after meals, before and after meals and at bedtime.  Blood glucose is sometimes over 200 and sometimes under 70. She is aware of hypoglycemia symptoms including shakiness and dizziness. She is concerned about low blood sugar, several less than 70 in the past few weeks. She is having numbness in feet and burning in feet.         She eats 2-3 servings of fruits and vegetables daily.She consumes 2 sweetened beverage(s) daily.She exercises with enough effort to increase her heart rate 9 or less minutes per day.  She exercises with enough effort to increase her heart rate 3 or less days per week.   She is taking medications regularly.       Diabetes Follow-up    How often are you checking your blood sugar? Four or more times daily  Blood sugar testing frequency justification:  Frequent hypoglycemia, Adjustment of medication(s) and Risk of hypoglycemia with medication(s)  What time of day are you checking your blood sugars (select all that apply)?  Before and after meals, At bedtime and and during the night time.   Have you had any blood sugars above 200?  Yes 269  Have you had any blood sugars below 70?  Yes 58    What symptoms do you notice when your blood sugar is low?  Shaky, Dizzy, Weak, Lethargy, Blurred vision and Confusion    What concerns do you have today about your diabetes? None and Other: Yes, blood sugars are all over the board.     Do you have any of these symptoms? (Select all that apply)  No numbness or tingling in feet.  No redness, sores or blisters on feet.  No complaints of excessive thirst.  No reports of blurry vision.  No significant changes to weight.        Hyperlipidemia Follow-Up      Are you regularly taking any medication or supplement to lower your cholesterol?   Yes- fenofibrate  (TRIGLIDE/LOFIBRA) 160 MG tablet    Are you having muscle aches or other side effects that you think could be caused by your cholesterol lowering medication?  No    Hypertension Follow-up      Do you check your blood pressure regularly outside of the clinic? Yes     Are you following a low salt diet? Yes    Are your blood pressures ever more than 140 on the top number (systolic) OR more   than 90 on the bottom number (diastolic), for example 140/90? No    BP Readings from Last 2 Encounters:   10/11/22 120/72   09/13/22 124/72     Hemoglobin A1C (%)   Date Value   09/13/2022 5.8 (H)   03/15/2022 6.2 (H)     LDL Cholesterol Calculated (no units)   Date Value   12/14/2021      Comment:     Cannot estimate LDL when triglyceride exceeds 400 mg/dL     LDL Cholesterol Direct (mg/dL)   Date Value   12/14/2021 103 (H)         Review of Systems     Constitutional, HEENT, cardiovascular, pulmonary, gi and gu systems are negative, except as otherwise noted.        Objective    /72 (BP Location: Left arm, Patient Position: Sitting, Cuff Size: Adult Regular)   Pulse 78   Temp 97.5  F (36.4  C) (Tympanic)   Wt 81.2 kg (179 lb)   SpO2 99%   BMI 32.74 kg/m    Body mass index is 32.74 kg/m .     Physical Exam     GENERAL: healthy, alert and no distress  RESP: lungs clear to auscultation - no rales, rhonchi or wheezes  CV: regular rate and rhythm, normal S1 S2, no S3 or S4, no murmur, click or rub, no peripheral edema  MS: no gross musculoskeletal defects noted, no edema

## 2022-10-16 ENCOUNTER — HEALTH MAINTENANCE LETTER (OUTPATIENT)
Age: 57
End: 2022-10-16

## 2022-11-02 DIAGNOSIS — E11.42 DIABETIC PERIPHERAL NEUROPATHY (H): ICD-10-CM

## 2022-11-02 NOTE — TELEPHONE ENCOUNTER
Mychart sent to verify dose and if tolerating. Also to remind pt to schedule DM appt for December  Garrison WYATT RN, BSN

## 2022-11-04 NOTE — TELEPHONE ENCOUNTER
-patient responded in MyChart      Refill does pass the refill protocol but routing to provider due to response/refill approval     Future Appointments 11/4/2022 - 5/3/2023      Date Visit Type Length Department Provider     12/7/2022  9:30 AM MYC OFFICE VISIT  30 min [facility] Jessy García APRN CNP    Location Instructions:     [facility] is located at [address] [place] [address], [address]. Free parking is available; access the lot by turning north from [address] onto [address], then west onto [address].              2/14/2023  1:30 PM NEW SLEEP 60 min  SLEEP CLINIC Seble Agarwal PA-C    Location Instructions:     Sleep Clinic appointments: Park in the parking lot or parking garage in front of the [facility].&nbsp;  Take elevators up to the 3rd floor, to [address] and check in with the .     CPAP and Durable Medical Equipment (DME) appointments: Park in the parking lot or parking garage in front of the [facility]ty [facility]. Take elevators up to the 2nd floor, to [address] and check in with [facility].                 Sera JOHNSON, RN   [facility] Triage

## 2022-11-08 DIAGNOSIS — Z90.710 S/P HYSTERECTOMY: Primary | ICD-10-CM

## 2022-11-08 RX ORDER — AMITRIPTYLINE HYDROCHLORIDE 10 MG/1
TABLET ORAL
Qty: 60 TABLET | Refills: 1 | Status: SHIPPED | OUTPATIENT
Start: 2022-11-08 | End: 2022-12-07

## 2022-11-08 NOTE — TELEPHONE ENCOUNTER
Med is historical.   Sent mychart reply to clarify dosing of estradiol.     See mychart [id number]    ELENI COON RN on 11/8/2022 at 8:37 AM   [facility]

## 2022-11-08 NOTE — TELEPHONE ENCOUNTER
Stacia Mai RN    DW    2:56 PM  I am currently only taking 10mg per day. I have been dizzy and sleepy. some pain relief. I will try 2 this weekend. I will also make an appointment      Routing to provider to review and advise.     Landy Salas RN  [facility]

## 2022-11-09 RX ORDER — ESTRADIOL 2 MG/1
2 TABLET ORAL DAILY
Qty: 90 TABLET | Refills: 0 | Status: SHIPPED | OUTPATIENT
Start: 2022-11-09 | End: 2023-01-04

## 2022-11-09 NOTE — TELEPHONE ENCOUNTER
"Pt sent [email or web address] message, please review and advise.    \"estradiol 2mg 1 per day  amitriptyline 10mg 1-2 per day  i need both called into [facility]  I do have a appointment to come in December\"    Joie Heaton RN [facility]    "

## 2022-11-27 DIAGNOSIS — I10 HYPERTENSION GOAL BP (BLOOD PRESSURE) < 140/90: ICD-10-CM

## 2022-11-28 RX ORDER — AMLODIPINE BESYLATE 10 MG/1
TABLET ORAL
Qty: 30 TABLET | Refills: 5 | Status: SHIPPED | OUTPATIENT
Start: 2022-11-28 | End: 2023-02-08

## 2022-12-05 ENCOUNTER — TELEPHONE (OUTPATIENT)
Dept: FAMILY MEDICINE | Facility: CLINIC | Age: 57
End: 2022-12-05

## 2022-12-05 NOTE — TELEPHONE ENCOUNTER
We spoke to your patient about diabetes care and noticed your patient has not filled a statin therapy at [facility] pharmacy in the last 180 days    Your patient would like us to reach out on their behalf to determine if it is appropriate to start on a statin therapy.  Please send a new RX for a statin if appropriate..

## 2022-12-06 NOTE — TELEPHONE ENCOUNTER
Per chart review, statins are listed as an allergy and patient reported that she has tried all statins and experienced severe muscle pains.     -Guillermo, CNP

## 2022-12-07 ENCOUNTER — OFFICE VISIT (OUTPATIENT)
Dept: FAMILY MEDICINE | Facility: CLINIC | Age: 57
End: 2022-12-07
Payer: COMMERCIAL

## 2022-12-07 VITALS
HEIGHT: 62 IN | TEMPERATURE: 97.8 F | RESPIRATION RATE: 12 BRPM | WEIGHT: 178 LBS | DIASTOLIC BLOOD PRESSURE: 78 MMHG | OXYGEN SATURATION: 99 % | BODY MASS INDEX: 32.76 KG/M2 | HEART RATE: 81 BPM | SYSTOLIC BLOOD PRESSURE: 124 MMHG

## 2022-12-07 DIAGNOSIS — Z12.11 SCREEN FOR COLON CANCER: ICD-10-CM

## 2022-12-07 DIAGNOSIS — E11.40 TYPE 2 DIABETES MELLITUS WITH DIABETIC NEUROPATHY, WITH LONG-TERM CURRENT USE OF INSULIN (H): Primary | ICD-10-CM

## 2022-12-07 DIAGNOSIS — N18.32 STAGE 3B CHRONIC KIDNEY DISEASE (H): ICD-10-CM

## 2022-12-07 DIAGNOSIS — E11.42 DIABETIC PERIPHERAL NEUROPATHY (H): ICD-10-CM

## 2022-12-07 DIAGNOSIS — Z13.220 SCREENING FOR HYPERLIPIDEMIA: ICD-10-CM

## 2022-12-07 DIAGNOSIS — M77.11 LATERAL EPICONDYLITIS OF RIGHT ELBOW: ICD-10-CM

## 2022-12-07 DIAGNOSIS — Z79.4 TYPE 2 DIABETES MELLITUS WITH DIABETIC NEUROPATHY, WITH LONG-TERM CURRENT USE OF INSULIN (H): Primary | ICD-10-CM

## 2022-12-07 LAB
CHOLEST SERPL-MCNC: 202 MG/DL
HBA1C MFR BLD: 6.1 % (ref 0–5.6)
HDLC SERPL-MCNC: 32 MG/DL
LDLC SERPL CALC-MCNC: ABNORMAL MG/DL
LDLC SERPL DIRECT ASSAY-MCNC: 68 MG/DL
NONHDLC SERPL-MCNC: 170 MG/DL
TRIGL SERPL-MCNC: 581 MG/DL

## 2022-12-07 PROCEDURE — 99214 OFFICE O/P EST MOD 30 MIN: CPT | Performed by: NURSE PRACTITIONER

## 2022-12-07 PROCEDURE — 80061 LIPID PANEL: CPT | Performed by: NURSE PRACTITIONER

## 2022-12-07 PROCEDURE — 83036 HEMOGLOBIN GLYCOSYLATED A1C: CPT | Performed by: NURSE PRACTITIONER

## 2022-12-07 PROCEDURE — 83721 ASSAY OF BLOOD LIPOPROTEIN: CPT | Mod: 59 | Performed by: NURSE PRACTITIONER

## 2022-12-07 PROCEDURE — 36415 COLL VENOUS BLD VENIPUNCTURE: CPT | Performed by: NURSE PRACTITIONER

## 2022-12-07 RX ORDER — AMITRIPTYLINE HYDROCHLORIDE 50 MG/1
50 TABLET ORAL AT BEDTIME
Qty: 90 TABLET | Refills: 3 | Status: SHIPPED | OUTPATIENT
Start: 2022-12-07 | End: 2022-12-27

## 2022-12-07 ASSESSMENT — ANXIETY QUESTIONNAIRES
8. IF YOU CHECKED OFF ANY PROBLEMS, HOW DIFFICULT HAVE THESE MADE IT FOR YOU TO DO YOUR WORK, TAKE CARE OF THINGS AT HOME, OR GET ALONG WITH OTHER PEOPLE?: SOMEWHAT DIFFICULT
7. FEELING AFRAID AS IF SOMETHING AWFUL MIGHT HAPPEN: NOT AT ALL
1. FEELING NERVOUS, ANXIOUS, OR ON EDGE: NOT AT ALL
GAD7 TOTAL SCORE: 1
6. BECOMING EASILY ANNOYED OR IRRITABLE: SEVERAL DAYS
5. BEING SO RESTLESS THAT IT IS HARD TO SIT STILL: NOT AT ALL
3. WORRYING TOO MUCH ABOUT DIFFERENT THINGS: NOT AT ALL
7. FEELING AFRAID AS IF SOMETHING AWFUL MIGHT HAPPEN: NOT AT ALL
IF YOU CHECKED OFF ANY PROBLEMS ON THIS QUESTIONNAIRE, HOW DIFFICULT HAVE THESE PROBLEMS MADE IT FOR YOU TO DO YOUR WORK, TAKE CARE OF THINGS AT HOME, OR GET ALONG WITH OTHER PEOPLE: SOMEWHAT DIFFICULT
2. NOT BEING ABLE TO STOP OR CONTROL WORRYING: NOT AT ALL
4. TROUBLE RELAXING: NOT AT ALL
GAD7 TOTAL SCORE: 1
GAD7 TOTAL SCORE: 1

## 2022-12-07 ASSESSMENT — PAIN SCALES - GENERAL: PAINLEVEL: NO PAIN (0)

## 2022-12-07 ASSESSMENT — PATIENT HEALTH QUESTIONNAIRE - PHQ9
10. IF YOU CHECKED OFF ANY PROBLEMS, HOW DIFFICULT HAVE THESE PROBLEMS MADE IT FOR YOU TO DO YOUR WORK, TAKE CARE OF THINGS AT HOME, OR GET ALONG WITH OTHER PEOPLE: SOMEWHAT DIFFICULT
SUM OF ALL RESPONSES TO PHQ QUESTIONS 1-9: 6
SUM OF ALL RESPONSES TO PHQ QUESTIONS 1-9: 6

## 2022-12-07 NOTE — PROGRESS NOTES
"  Assessment & Plan     Jayda was seen today for diabetes.    Diagnoses and all orders for this visit:    Type 2 diabetes mellitus with diabetic neuropathy, with long-term current use of insulin (H)  Currently well controlled on Basaglar 25 units at bedtime, Novolong 12 units with each meal, Metformin 500 mg twice daily.  New Invokana start.    -     canagliflozin (INVOKANA) 100 MG tablet; Take 1 tablet (100 mg) by mouth every morning (before breakfast)    Diabetic peripheral neuropathy (H)  Increase Amitriptyline 50 mg daily for improved neuropathy control    -     amitriptyline (ELAVIL) 50 MG tablet; Take 1 tablet (50 mg) by mouth At Bedtime  -     HEMOGLOBIN A1C  -     Lipid panel reflex to direct LDL Non-fasting  -     LDL cholesterol direct    Stage 3b chronic kidney disease (H)  Start SGLT-I for kidney protection.    -     canagliflozin (INVOKANA) 100 MG tablet; Take 1 tablet (100 mg) by mouth every morning (before breakfast)    Lateral epicondylitis of right elbow  Further consultation with orthopedics.    -     Orthopedic  Referral; Future    Screen for colon cancer  -     Colonoscopy Screening  Referral; Future    Screening for hyperlipidemia  -     Lipid panel reflex to direct LDL Non-fasting  -     LDL cholesterol direct    Other orders  -     REVIEW OF HEALTH MAINTENANCE PROTOCOL ORDERS       BMI:   Estimated body mass index is 32.56 kg/m  as calculated from the following:    Height as of this encounter: 1.575 m (5' 2\").    Weight as of this encounter: 80.7 kg (178 lb).     Return in about 3 months (around 3/7/2023) for Med check, In Clinic.    Jessy García, KEM CNP  [facility]    Alvarez Montelongo is a 57 year old, presenting for the following health issues:  Diabetes    History of Present Illness       Diabetes:   She presents for follow up of diabetes.  She is checking home blood glucose four or more times daily. She checks blood glucose before meals, " after meals, before and after meals and at bedtime.  Blood glucose is sometimes over 200 and never under 70. She is aware of hypoglycemia symptoms including shakiness, blurred vision and confusion. She is concerned about other. She is having numbness in feet and burning in feet.       Hemoglobin A1C   Date Value Ref Range Status   12/07/2022 6.1 (H) 0.0 - 5.6 % Final     Comment:     Normal <5.7%   Prediabetes 5.7-6.4%    Diabetes 6.5% or higher     Note: Adopted from ADA consensus guidelines.   09/13/2022 5.8 (H) 0.0 - 5.6 % Final     Comment:     Normal <5.7%   Prediabetes 5.7-6.4%    Diabetes 6.5% or higher     Note: Adopted from ADA consensus guidelines.   03/15/2022 6.2 (H) 0.0 - 5.6 % Final     Comment:     Normal <5.7%   Prediabetes 5.7-6.4%    Diabetes 6.5% or higher     Note: Adopted from ADA consensus guidelines.       Basaglar 25 units before bed. 12 units with 2 carb meals  Fasting blood sugars are 110's.    No hypoglycemia.    Continued neuropathy - cramping, stabbing pain.    Is getting some relief from medication - Amitriptyline, 20 mg daily - tolerating.      History of right tennis elbow.  Previous steroid injection, would like another injection.      Hypertension: She presents for follow up of hypertension.  She does not check blood pressure  regularly outside of the clinic.      She eats 2-3 servings of fruits and vegetables daily.She consumes 2 sweetened beverage(s) daily.She exercises with enough effort to increase her heart rate 10 to 19 minutes per day.  She exercises with enough effort to increase her heart rate 3 or less days per week.   She is taking medications regularly.    Today's PHQ-9         PHQ-9 Total Score: 6    PHQ-9 Q9 Thoughts of better off dead/self-harm past 2 weeks :   Not at all    How difficult have these problems made it for you to do your work, take care of things at home, or get along with other people: Somewhat difficult  Today's CONSTANCE-7 Score: 1       Review of Systems  "    Constitutional, HEENT, cardiovascular, pulmonary, gi and gu systems are negative, except as otherwise noted.        Objective    /78   Pulse 81   Temp 97.8  F (36.6  C) (Tympanic)   Resp 12   Ht 1.575 m (5' 2\")   Wt 80.7 kg (178 lb)   SpO2 99%   BMI 32.56 kg/m    Body mass index is 32.56 kg/m .     Physical Exam     GENERAL: healthy, alert and no distress  RESP: lungs clear to auscultation - no rales, rhonchi or wheezes  CV: regular rate and rhythm, normal S1 S2, no S3 or S4, no murmur, click or rub, no peripheral edema  MS: right elbow with full ROM, tenderness to palpation long lateral aspect of joint  NEURO: Normal strength and tone, mentation intact and speech normal  PSYCH: mentation appears normal, affect normal/bright            "

## 2022-12-07 NOTE — RESULT ENCOUNTER NOTE
Dear Jayda,     -A1C (test of diabetes control the last 2-3 months) is stable.        Please send a [employer] message or call [phone]  if you have any questions.      KEM Adams, CNP  [facility]    If you have further questions about the interpretation of your labs, [email or web address] is a good website to check out for further information.

## 2022-12-08 NOTE — RESULT ENCOUNTER NOTE
Dear Jayda,     -HDL(good) cholesterol level is low and your triglycerides are extremely elevated .    -LDL (bad cholesterol) is less than 100 and well controlled.      ADVISE: continue medication (fenofibrate), exercising 150 minutes of aerobic exercise per week (30 minutes 5 days per week or 50 minutes 3 days per week are options), and omega-3 fatty acids (fish oil) 4736-6245 mg daily are helpful to improve this.  In 6 months, you should recheck your fasting cholesterol panel.          Please send a [employer] message or call [phone]  if you have any questions.      KEM Adams, CNP  [facility]    If you have further questions about the interpretation of your labs, [email or web address] is a good website to check out for further information.

## 2022-12-12 DIAGNOSIS — Z79.4 TYPE 2 DIABETES MELLITUS WITH HYPERGLYCEMIA, WITH LONG-TERM CURRENT USE OF INSULIN (H): ICD-10-CM

## 2022-12-12 DIAGNOSIS — E11.65 TYPE 2 DIABETES MELLITUS WITH HYPERGLYCEMIA, WITH LONG-TERM CURRENT USE OF INSULIN (H): ICD-10-CM

## 2022-12-23 ENCOUNTER — MYC MEDICAL ADVICE (OUTPATIENT)
Dept: FAMILY MEDICINE | Facility: CLINIC | Age: 57
End: 2022-12-23

## 2022-12-23 DIAGNOSIS — E11.42 DIABETIC PERIPHERAL NEUROPATHY (H): Primary | ICD-10-CM

## 2022-12-27 ENCOUNTER — TELEPHONE (OUTPATIENT)
Dept: FAMILY MEDICINE | Facility: CLINIC | Age: 57
End: 2022-12-27

## 2022-12-27 NOTE — TELEPHONE ENCOUNTER
Reason for Call:  Form, our goal is to have forms completed with 72 hours, however, some forms may require a visit or additional information.    Type of letter, form or note:  medical    Who is the form from?: Patient    Where did the form come from: Patient or family brought in       What clinic location was the form placed at?: [facility]    Where the form was placed: [employer] Box/Folder    What number is listed as a contact on the form?: [phone]    Call taken on 12/27/2022 at 3:11 PM by Gloria Jernigan

## 2022-12-27 NOTE — TELEPHONE ENCOUNTER
Please see my chart message below     Please review and advise     Thank you     Rocio Zeng RN, BSN  [place] [facility]

## 2022-12-28 NOTE — TELEPHONE ENCOUNTER
Completed forms faxed back to [facility]  at [phone]  Copy up front for patient    Originals sent to be scanned.       Deana Levine

## 2023-01-03 DIAGNOSIS — Z90.710 S/P HYSTERECTOMY: ICD-10-CM

## 2023-01-04 RX ORDER — ESTRADIOL 2 MG/1
TABLET ORAL
Qty: 90 TABLET | Refills: 1 | Status: SHIPPED | OUTPATIENT
Start: 2023-01-04 | End: 2023-04-11

## 2023-01-18 ENCOUNTER — TELEPHONE (OUTPATIENT)
Dept: FAMILY MEDICINE | Facility: CLINIC | Age: 58
End: 2023-01-18
Payer: COMMERCIAL

## 2023-01-18 NOTE — TELEPHONE ENCOUNTER
Reason for Call:  Form, our goal is to have forms completed with 72 hours, however, some forms may require a visit or additional information.    Type of letter, form or note:  RX Clarification form    Who is the form from?: RX  (if other please explain)    Where did the form come from: form was faxed in    What clinic location was the form placed at?: [facility]    Where the form was placed: Jessy García Box/Folder    What number is listed as a contact on the form?: [phone] Fax           Call taken on 1/18/2023 at 5:59 PM by Fadumo Dillard

## 2023-01-18 NOTE — TELEPHONE ENCOUNTER
We spoke to your patient about diabetes care and noticed your patient has not filled a statin therapy at [facility] pharmacy in the last 180 days.  Your patient would like us to reach out on their behalf to determine if it is appropriate to start a statin therapy. Please send a new prescription for statin therapy if it is appropriate.

## 2023-02-06 DIAGNOSIS — I10 HYPERTENSION GOAL BP (BLOOD PRESSURE) < 140/90: ICD-10-CM

## 2023-02-07 ASSESSMENT — SLEEP AND FATIGUE QUESTIONNAIRES
HOW LIKELY ARE YOU TO NOD OFF OR FALL ASLEEP IN A CAR, WHILE STOPPED FOR A FEW MINUTES IN TRAFFIC: WOULD NEVER DOZE
HOW LIKELY ARE YOU TO NOD OFF OR FALL ASLEEP WHEN YOU ARE A PASSENGER IN A CAR FOR AN HOUR WITHOUT A BREAK: HIGH CHANCE OF DOZING
HOW LIKELY ARE YOU TO NOD OFF OR FALL ASLEEP WHILE SITTING AND TALKING TO SOMEONE: WOULD NEVER DOZE
HOW LIKELY ARE YOU TO NOD OFF OR FALL ASLEEP WHILE SITTING QUIETLY AFTER LUNCH WITHOUT ALCOHOL: MODERATE CHANCE OF DOZING
HOW LIKELY ARE YOU TO NOD OFF OR FALL ASLEEP WHILE WATCHING TV: MODERATE CHANCE OF DOZING
HOW LIKELY ARE YOU TO NOD OFF OR FALL ASLEEP WHILE LYING DOWN TO REST IN THE AFTERNOON WHEN CIRCUMSTANCES PERMIT: MODERATE CHANCE OF DOZING
HOW LIKELY ARE YOU TO NOD OFF OR FALL ASLEEP WHILE SITTING AND READING: SLIGHT CHANCE OF DOZING
HOW LIKELY ARE YOU TO NOD OFF OR FALL ASLEEP WHILE SITTING INACTIVE IN A PUBLIC PLACE: MODERATE CHANCE OF DOZING

## 2023-02-08 RX ORDER — AMLODIPINE BESYLATE 10 MG/1
TABLET ORAL
Qty: 30 TABLET | Refills: 5 | Status: SHIPPED | OUTPATIENT
Start: 2023-02-08 | End: 2023-10-02

## 2023-02-08 NOTE — TELEPHONE ENCOUNTER
Routing refill request to provider for review/approval because:   Calcium Channel Blockers Protocol  Failed 02/06/2023 09:42 AM   Protocol Details  Normal serum creatinine on file in past 12 months      Rocio Zeng RN, BSN  [facility]

## 2023-02-14 ENCOUNTER — VIRTUAL VISIT (OUTPATIENT)
Dept: SLEEP MEDICINE | Facility: CLINIC | Age: 58
End: 2023-02-14
Attending: NURSE PRACTITIONER
Payer: COMMERCIAL

## 2023-02-14 VITALS — HEIGHT: 62 IN | BODY MASS INDEX: 31.28 KG/M2 | WEIGHT: 170 LBS

## 2023-02-14 DIAGNOSIS — R06.83 SNORING: ICD-10-CM

## 2023-02-14 DIAGNOSIS — G47.19 EXCESSIVE DAYTIME SLEEPINESS: ICD-10-CM

## 2023-02-14 DIAGNOSIS — G47.00 INSOMNIA, UNSPECIFIED TYPE: ICD-10-CM

## 2023-02-14 DIAGNOSIS — R06.81 WITNESSED APNEIC SPELLS: Primary | ICD-10-CM

## 2023-02-14 DIAGNOSIS — R25.8 NOCTURNAL LEG MOVEMENTS: ICD-10-CM

## 2023-02-14 DIAGNOSIS — I10 ESSENTIAL HYPERTENSION: ICD-10-CM

## 2023-02-14 PROCEDURE — 99204 OFFICE O/P NEW MOD 45 MIN: CPT | Mod: VID | Performed by: PHYSICIAN ASSISTANT

## 2023-02-14 ASSESSMENT — PAIN SCALES - GENERAL: PAINLEVEL: SEVERE PAIN (6)

## 2023-02-14 NOTE — NURSING NOTE
Is the patient currently in the state of [place]? YES    Visit mode:VIDEO    If the visit is dropped, the patient can be reconnected by: VIDEO VISIT: Send to e-mail at: [email or web address]    Will anyone else be joining the visit? NO      How would you like to obtain your AVS? MyChart    Are changes needed to the allergy or medication list? NO    Comments or concerns regarding today's visit: chandana Salas

## 2023-02-14 NOTE — PROGRESS NOTES
Video-Visit Details    Type of service:  Video Visit    Video Start Time (time video started): 1:41PM    Video End Time (time video stopped): 2:16PM    Originating Location (pt. Location): Home        Distant Location (provider location):  Off-site    Mode of Communication:  Video Conference via [facility]      Outpatient Sleep Medicine Consultation:      Name: Jayda Berrios MRN# [id number]   Age: 57 year old YOB: 1965     Date of Consultation: February 14, 2023  Consultation is requested by: Jessy García, [facility]  [address] [place] [address] Jessy García  Primary care provider: Jessy García       Reason for Sleep Consult:     Jayda Berrios is sent by Jessy García for a sleep consultation regarding concern for sleep apnea.    Patient s Reason for visit  Jayda Berrios main reason for visit: snoring  Patient states problem(s) started: 15 years ago  Jayda Berrios's goals for this visit: help with a good night sleep           Assessment and Plan:     1. Witnessed apneic spells  2. Snoring  3. Excessive daytime sleepiness  4. Nocturnal leg movements  5. Insomnia, unspecified type  6. Essential hypertension    Patient is being evaluated for Obstructive Sleep Apnea (BARRINGTON).  Patient's risk factors for BARRINGTON include: snoring, excessive daytime sleepiness (mild ESS 12), witnessed apneas, obesity (BMI 31), high blood pressure, age >50. We discussed pathophysiology of BARRINGTON and consequences of untreated moderate to severe sleep apnea such as a higher risk of hypertension, cardiovascular disease, cardiac arrhythmias, and stroke. Patient is interested in treatment and willing to undergo overnight sleep testing.  Discussed testing with overnight attended polysomnography versus home sleep apnea testing.  In lab PSG is preferred given patient's additional complaint of kicking movements in sleep. Has possible RLS versus discomfort from known neuropathy.  Orders were placed for in  "lab PSG today but patient will also check with her insurance on coverage, if too expensive we will plan to switch the order to HST with the understanding that this is suboptimal evaluation given no movement monitors with HST.  In the likely event that BARRINGTON is diagnosed on PSG she is very open to starting CPAP.  She has a couple friends who have CPAP and have benefited greatly. Will plan to discuss in more detail at next visit pending study results.  Insomnia severity index elevated today, suspect improvement once sleep disordered breathing is addressed by some psychophysiological components as well, could consider CBT-I in future but will start with sleep study.  Plan to see her back 1-2 weeks after study for review of results and to discuss next steps.  Educational materials provided in instructions.   - Comprehensive Sleep Study; Future         History of Present Illness:     Jayda Berrios is a 57-year-old female with obesity, type 2 diabetes mellitus with neuropathy, CKD, HTN, CAD, GERD who presents to clinic today for evaluation of suspected BARRINGTON. Reports has been meaning to be seen by sleep medicine for a number of years but \"life's been crazy\".  and girlfriends on vacation will complain of her loud snoring and worry about her stopping breathing in her sleep. Saw ENT in the past who did not see any obvious anatomical obstruction.     SLEEP-WAKE SCHEDULE:     Work/School Days: Patient goes to school/work: No   Usually gets into bed at 10:30pm  Takes patient about 1 hour to fall asleep - admits to TV or book during this time  Has trouble falling asleep 5 nights per week  Wakes up in the middle of the night at least 3 times , \"most the time it's my  who pushes me from my snoring or not breathing or my neuropathy since that is pretty severe\"  Wakes up due to Snorting self awake;Pain;Use the bathroom   She has trouble falling back asleep 4 times a week.   It usually takes 20-30 minutes to get back to " "sleep  Patient is usually up at 9am  Uses alarm: No    Weekends/Non-work Days/All Other Days:  Usually gets into bed at 10:30pm   Takes patient about 1-2 hours to fall asleep  Patient is usually up at 9am  Uses alarm: No    Sleep Need  Patient gets 7-8 hours sleep on average   Patient thinks she needs about 8 hours sleep    Jayda Berrios prefers to sleep in this position(s): Side   Patient states they do the following activities in bed: Watch TV    Naps  Patient takes a purposeful nap 0 times a week  She feels better after a nap: Yes  She dozes off unintentionally 4 days per week - watching TV around 2:30-3:00PM for 20-30 minutes    Patient has had a driving accident or near-miss due to sleepiness/drowsiness: No  She had a total [place] Sleepiness Scale of 12 (02/07/23 1227), with scores of 10 or higher indicating abnormal levels of sleepiness.    SLEEP DISRUPTIONS:    Breathing/Snoring  Patient snores:Yes  Other people complain about her snoring: Yes  Patient has been told she stops breathing in her sleep:Yes   Occasional gasp/snort arousals  She has issues with the following: Morning headaches;Morning mouth dryness;Stuffy nose when you wake up;Heartburn or reflux at night;Getting up to urinate more than once    Movement:  Patient gets pain, discomfort, with an urge to move:  Yes - \"I will be moving my feet and ankles it's the oddest habit so I keep pillows in my knees and my feet elevated\".   It happens when she is resting:  Yes  It happens more at night:  Yes   Movement ? Helpful.   Patient has been told she kicks her legs at night:  Yes     Behaviors in Sleep:  Denies sleep talking, sleepwalking, sleep eating, dream enactment, bruxism, recurring nightmares, night terrors, sleep paralysis, hypnagogic/hypnopompic hallucinations, cataplexy    CAFFEINE AND OTHER SUBSTANCES:    Patient consumes caffeinated beverages per day:  2  Last caffeine use is usually: 3pm  List of any prescribed or over the counter " stimulants that patient takes: none  List of any prescribed or over the counter sleep medication patient takes: none  List of previous sleep medications that patient has tried: melotonin  Patient drinks alcohol to help them sleep: No  Patient drinks alcohol near bedtime: No    Family History:  Patient has a family member been diagnosed with a sleep disorder: No      SCALES:    EPWORTH SLEEPINESS SCALE      [place] Sleepiness Scale ( ROXANE Tejada  [id number]<br>ESS - [place]/English - Final version - 21 Nov 07 - [facility] [place].) 2/7/2023   Sitting and reading Slight chance of dozing   Watching TV Moderate chance of dozing   Sitting, inactive in a public place (e.g. a theatre or a meeting) Moderate chance of dozing   As a passenger in a car for an hour without a break High chance of dozing   Lying down to rest in the afternoon when circumstances permit Moderate chance of dozing   Sitting and talking to someone Would never doze   Sitting quietly after a lunch without alcohol Moderate chance of dozing   In a car, while stopped for a few minutes in traffic Would never doze   [place] Score (MC) 12   [place] Score (Sleep) 12         INSOMNIA SEVERITY INDEX (TANISHA)      Insomnia Severity Index (TANISHA) 2/7/2023   Difficulty falling asleep 3   Difficulty staying asleep 3   Problems waking up too early 2   How SATISFIED/DISSATISFIED are you with your CURRENT sleep pattern? 3   How NOTICEABLE to others do you think your sleep problem is in terms of impairing the quality of your life? 4   How WORRIED/DISTRESSED are you about your current sleep problem? 3   To what extent do you consider your sleep problem to INTERFERE with your daily functioning (e.g. daytime fatigue, mood, ability to function at work/daily chores, concentration, memory, mood, etc.) CURRENTLY? 4   TANISHA Total Score 22       Guidelines for Scoring/Interpretation:  Total score categories:  0-7 = No clinically significant insomnia   8-14 = Subthreshold insomnia   15-21  = Clinical insomnia (moderate severity)  22-28 = Clinical insomnia (severe)  Used via courtesy of www.myhealth.va.gov with permission from Chris Beckman PhD., [facility]      Allergies:    Allergies   Allergen Reactions     Iodine Anaphylaxis     Peanut Oil Anaphylaxis     Statins [Hmg-Coa-R Inhibitors] Muscle Pain (Myalgia)     Patient reports that she tried all statins and was not able to tolerate any of them due to severe muscle pains.      Ibuprofen Hives     Lisinopril Cough and Other (See Comments)     Codeine Rash       Medications:    Current Outpatient Medications   Medication Sig Dispense Refill     alcohol swab prep pads Use to swab area of injection/mily as directed 100 each 3     amitriptyline (ELAVIL) 25 MG tablet Take 1 tablet (25 mg) by mouth At Bedtime 90 tablet 1     amLODIPine (NORVASC) 10 MG tablet TAKE 1 TABLET BY MOUTH EVERY DAY 30 tablet 5     aspirin (ASA) 81 MG EC tablet Take 1 tablet by mouth       blood glucose (ACCU-CHEK SMARTVIEW) test strip USE TO TEST BLOOD SUGAR 2 TIMES DAILY OR AS DIRECTED. 200 strip 3     blood glucose ([employer] BLOOD GLUCOSE TEST) test strip As directed. Test 3 times per day. accu-check, smartview nixon machine please       blood glucose (NO BRAND SPECIFIED) test strip Use to test blood sugar 2 times daily or as directed. To accompany: Blood Glucose Monitor Brands: ACCU-CHEK NIXON 100 strip 6     blood glucose calibration (NO BRAND SPECIFIED) solution Use to calibrate blood glucose monitor as needed as directed. To accompany: Blood Glucose Monitor Brands: ACCU-CHEK NIXON 100 each 3     blood glucose monitoring (NO BRAND SPECIFIED) meter device kit Use to test blood sugar 2 times daily or as directed. Brand Accu-check Nixon 1 kit 0     canagliflozin (INVOKANA) 100 MG tablet Take 1 tablet (100 mg) by mouth every morning (before breakfast) 90 tablet 3     carvedilol (COREG) 12.5 MG tablet Take 1 tablet (12.5 mg) by mouth 2 times daily (with meals) 60 tablet 5      cyclobenzaprine (FLEXERIL) 5 MG tablet Take 1 tablet (5 mg) by mouth 3 times daily as needed for muscle spasms 30 tablet 1     estradiol (ESTRACE) 2 MG tablet TAKE 1 TABLET BY MOUTH DAILY 90 tablet 1     fenofibrate (TRIGLIDE/LOFIBRA) 160 MG tablet TAKE 1 TABLET (160 MG) BY MOUTH ONCE DAILY WITH A MEAL. 90 tablet 3     insulin aspart (NOVOLOG PEN) 100 UNIT/ML pen Inject 12 Units Subcutaneous 3 times daily (with meals) SSI 45 mL 3     insulin glargine (BASAGLAR KWIKPEN) 100 UNIT/ML pen Inject 25 Units Subcutaneous At Bedtime 45 mL 3     insulin pen needle (32G X 4 MM) 32G X 4 MM miscellaneous Use 4 pen needles daily or as directed. 200 each 0     magnesium oxide (MAG-OX) 400 MG tablet Take 1 tablet (400 mg) by mouth daily OTC       metFORMIN (GLUCOPHAGE) 500 MG tablet TAKE 1 TABLET BY MOUTH TWICE A DAY WITH MEALS 60 tablet 14     omeprazole (PRILOSEC) 40 MG DR capsule Take 1 capsule by mouth       ondansetron (ZOFRAN) 4 MG tablet        STATIN NOT PRESCRIBED (INTENTIONAL) Please choose reason not prescribed from choices below.       SUMAtriptan (IMITREX) 50 MG tablet        thin (NO BRAND SPECIFIED) lancets Use to test blood sugar 2 times daily or as directed. To accompany: Blood Glucose Monitor Brands: ACCU-CHEK MARCIE 200 each 3     hydrochlorothiazide (HYDRODIURIL) 12.5 MG tablet Take 12.5 mg by mouth         Problem List:  Patient Active Problem List    Diagnosis Date Noted     S/P hysterectomy 10/11/2022     Priority: Medium     Age 32, history of endometriosis       Type 2 diabetes mellitus with diabetic neuropathy, with long-term current use of insulin (H) 12/14/2021     Priority: Medium     Stage 3b chronic kidney disease (H) 12/14/2021     Priority: Medium     Diabetic peripheral neuropathy (H) 04/29/2019     Priority: Medium        Past Medical/Surgical History:  Past Medical History:   Diagnosis Date     History of migraine headaches     on Imitrex, Hydrocodone, NSAID prn     Hypertension 2017     NSTEMI  (non-ST elevated myocardial infarction) (H) 05/2021     Tarsal tunnel syndrome of both lower extremities      Type 2 diabetes mellitus with hyperglycemia, with long-term current use of insulin (H)      Past Surgical History:   Procedure Laterality Date     BACK SURGERY  2020     HYSTERECTOMY, PAP NO LONGER INDICATED Bilateral     Endometriosis at age 32     LAPAROSCOPIC CHOLECYSTECTOMY       LUMBAR DISCECTOMY      at age 38- back pain free.       Social History:  Social History     Socioeconomic History     Marital status:      Spouse name: Not on file     Number of children: Not on file     Years of education: Not on file     Highest education level: Not on file   Occupational History     Not on file   Tobacco Use     Smoking status: Never     Smokeless tobacco: Never   Vaping Use     Vaping Use: Never used   Substance and Sexual Activity     Alcohol use: Never     Drug use: Never     Sexual activity: Not Currently     Partners: Male     Birth control/protection: Female Surgical   Other Topics Concern     Parent/sibling w/ CABG, MI or angioplasty before 65F 55M? No   Social History Narrative     Not on file     Social Determinants of Health     Financial Resource Strain: Not on file   Food Insecurity: Not on file   Transportation Needs: Not on file   Physical Activity: Not on file   Stress: Not on file   Social Connections: Not on file   Intimate Partner Violence: Not on file   Housing Stability: Not on file       Family History:  Family History   Problem Relation Age of Onset     Breast Cancer No family hx of      Colon Cancer No family hx of      Diabetes No family hx of        Review of Systems:  Fevers: No  Night Sweats: No  Weight Gain: No  Pain at Night: Yes  Double Vision: No  Changes in Vision: No  Difficulty Breathing through Nose: No  Sore Throat in Morning: No  Dry Mouth in the Morning: No  Shortness of Breath Lying Flat: No  Shortness of Breath With Activity: Yes  Awakening with Shortness of  "Breath: No  Increased Cough: No  Heart Racing at Night: No  Swelling in Feet or Legs: Yes  Diarrhea at Night: No  Heartburn at Night: Yes  Urinating More than Once at Night: Yes  Losing Control of Urine at Night: No  Joint Pains at Night: No  Headaches in Morning: No  Weakness in Arms or Legs: No  Depressed Mood: No  Anxiety: No    Physical Examination:  Vitals: Ht 1.575 m (5' 2\")   Wt 77.1 kg (170 lb)   BMI 31.09 kg/m    BMI= Body mass index is 31.09 kg/m .  General appearance: Awake, alert, cooperative. Well groomed. Sitting comfortably in chair. In no apparent distress.  HEENT: Head: Normocephalic, atraumatic. Eyes:Conjunctiva clear. Sclera normal. Nose: External appearance without deformity.   Pulmonary:  Able to speak easily in full sentences. No cough or wheeze.   Skin:  No rashes or significant lesions on visible skin.   Neurologic: Alert, oriented x3.   Psychiatric: Mood euthymic. Affect congruent with full range and intensity.         Data: All pertinent previous laboratory data reviewed     Recent Labs   Lab Test 10/11/22  1349 09/13/22  1539    134   POTASSIUM 4.6 4.1   CHLORIDE 104 102   CO2 26 20   ANIONGAP 7 12   * 115*   BUN 38* 48*   CR 1.60* 1.91*   RUBY 8.7 9.3       Recent Labs   Lab Test 10/11/22  1349   WBC 6.9   RBC 3.74*   HGB 11.3*   HCT 33.8*   MCV 90   MCH 30.2   MCHC 33.4   RDW 12.7          Recent Labs   Lab Test 03/15/22  1449   PROTTOTAL 7.2   ALBUMIN 3.6   BILITOTAL 0.3   ALKPHOS 59   AST 35   ALT 42       No results found for: TSH    No results found for: UAMP, UBARB, BENZODIAZEUR, UCANN, UCOC, OPIT, UPCP    Iron Sat Index   Date/Time Value Ref Range Status   09/13/2022 03:58 PM 15 15 - 46 % Final     Ferritin   Date/Time Value Ref Range Status   09/13/2022 03:39 PM 78 8 - 252 ng/mL Final       No results found for: PH, PHARTERIAL, PO2, [id number], SAT, PCO2, HCO3, BASEEXCESS, DAJA, BEB    @LABRCNTIPR(phv:4,pco2v:4,po2v:4,hco3v:4,kerwin:4,o2per:4)@    Echocardiology: " No results found for this or any previous visit (from the past 4320 hour(s)).    Chest x-ray: No results found for this or any previous visit from the past 365 days.      Chest CT: No results found for this or any previous visit from the past 365 days.      PFT: Most Recent [facility]ze Pulmonary Function Testing    No results found for: 20001  No results found for: 20002  No results found for: 20003  No results found for: 20015  No results found for: 20016  No results found for: 20027  No results found for: 20028  No results found for: 20029  No results found for: 20079  No results found for: 20080  No results found for: 20081  No results found for: 20335  No results found for: 20105  No results found for: 20053  No results found for: 20054  No results found for: 20055      Seble Agarwal PA-C 2/14/2023     [facility]  [address] [address]     [facility]  [address] [address]    Chart documentation was completed, in part, with [employer] voice-recognition software. Even though reviewed, some grammatical, spelling, and word errors may remain.    55 minutes spent on day of encounter reviewing medical records, history and physical examination, review of previous testing and interpretation, documentation and further activities as noted above

## 2023-02-14 NOTE — PATIENT INSTRUCTIONS
"      MY TREATMENT INFORMATION FOR SLEEP APNEA-  Jayda HARLEY Agustina    Am I having a sleep study at a sleep center?  --->Due to normal delays, you will be contacted within 2-4 weeks to schedule    Am I having a home sleep study?  --->Watch the video for the device you are using:    -/drop off device-   https://www.[employer].com/watch?v=yGGFBdELGhk  Frequently asked questions:  1. What is Obstructive Sleep Apnea (BARRINGTON)? BARRINGTON is the most common type of sleep apnea. Apnea means, \"without breath.\"  Apnea is most often caused by narrowing or collapse of the upper airway as muscles relax during sleep.   Almost everyone has occasional apneas. Most people with sleep apnea have had brief interruptions at night frequently for many years.  The severity of sleep apnea is related to how frequent and severe the events are.   2. What are the consequences of BARRINGTON? Symptoms include: feeling sleepy during the day, snoring loudly, gasping or stopping of breathing, trouble sleeping, and occasionally morning headaches or heartburn at night.  Sleepiness can be serious and even increase the risk of falling asleep while driving. Other health consequences may include development of high blood pressure and other cardiovascular disease in persons who are susceptible. Untreated BARRINGTON  can contribute to heart disease, stroke and diabetes.   3. What are the treatment options? In most situations, sleep apnea is a lifelong disease that must be managed with daily therapy. Medications are not effective for sleep apnea and surgery is generally not considered until other therapies have been tried. Your treatment is your choice . Continuous Positive Airway (CPAP) works right away and is the therapy that is effective in nearly everyone. An oral device to hold your jaw forward is usually the next most reliable option. Other options include postioning devices (to keep you off your back), weight loss, and surgery including a tongue pacing device. There is " more detail about some of these options below.  4. Are my sleep studies covered by insurance? Although we will request verification of coverage, we advise you also check in advance of the study to ensure there is coverage.    Important tips for those choosing CPAP and similar devices   Know your equipment:  CPAP is continuous positive airway pressure that prevents obstructive sleep apnea by keeping the throat from collapsing while you are sleeping. In most cases, the device is  smart  and can slowly self-adjusts if your throat collapses and keeps a record every day of how well you are treated-this information is available to you and your care team.  BPAP is bilevel positive airway pressure that keeps your throat open and also assists each breath with a pressure boost to maintain adequate breathing.  Special kinds of BPAP are used in patients who have inadequate breathing from lung or heart disease. In most cases, the device is  smart  and can slowly self-adjusts to assist breathing. Like CPAP, the device keeps a record of how well you are treated.  Your mask is your connection to the device. You get to choose what feels most comfortable and the staff will help to make sure if fits. Here: are some examples of the different masks that are available:       Key points to remember on your journey with sleep apnea:  Sleep study.  PAP devices often need to be adjusted during a sleep study to show that they are effective and adjusted right.  Good tips to remember: Try wearing just the mask during a quiet time during the day so your body adapts to wearing it. A humidifier is recommended for comfort in most cases to prevent drying of your nose and throat. Allergy medication from your provider may help you if you are having nasal congestion.  Getting settled-in. It takes more than one night for most of us to get used to wearing a mask. Try wearing just the mask during a quiet time during the day so your body adapts to wearing  it. A humidifier is recommended for comfort in most cases. Our team will work with you carefully on the first day and will be in contact within 4 days and again at 2 and 4 weeks for advice and remote device adjustments. Your therapy is evaluated by the device each day.   Use it every night. The more you are able to sleep naturally for 7-8 hours, the more likely you will have good sleep and to prevent health risks or symptoms from sleep apnea. Even if you use it 4 hours it helps. Occasionally all of us are unable to use a medical therapy, in sleep apnea, it is not dangerous to miss one night.   Communicate. Call our skilled team on the number provided on the first day if your visit for problems that make it difficult to wear the device. Over 2 out of 3 patients can learn to wear the device long-term with help from our team. Remember to call our team or your sleep providers if you are unable to wear the device as we may have other solutions for those who cannot adapt to mask CPAP therapy. It is recommended that you sleep your sleep provider within the first 3 months and yearly after that if you are not having problems.   Use it for your health. We encourage use of CPAP masks during daytime quiet periods to allow your face and brain to adapt to the sensation of CPAP so that it will be a more natural sensation to awaken to at night or during naps. This can be very useful during the first few weeks or months of adapting to CPAP though it does not help medically to wear CPAP during wakefulness and  should not be used as a strategy just to meet guidelines.  Take care of your equipment. Make sure you clean your mask and tubing using directions every day and that your filter and mask are replaced as recommended or if they are not working.     BESIDES CPAP, WHAT OTHER THERAPIES ARE THERE?    Positioning Device  Positioning devices are generally used when sleep apnea is mild and only occurs on your back.This example shows a  pillow that straps around the waist. It may be appropriate for those whose sleep study shows milder sleep apnea that occurs primarily when lying flat on one's back. Preliminary studies have shown benefit but effectiveness at home may need to be verified by a home sleep test. These devices are generally not covered by medical insurance.  Examples of devices that maintain sleeping on the back to prevent snoring and mild sleep apnea.    Belt type body positioner  http://Synchronized[employer]/    Electronic reminder  http://nightshifttherapy.com/            Oral Appliance  What is oral appliance therapy?  An oral appliance device fits on your teeth at night like a retainer used after having braces. The device is made by a specialized dentist and requires several visits over 1-2 months before a manufactured device is made to fit your teeth and is adjusted to prevent your sleep apnea. Once an oral device is working properly, snoring should be improved. A home sleep test may be recommended at that time if to determine whether the sleep apnea is adequately treated.       Some things to remember:  -Oral devices are often, but not always, covered by your medical insurance. Be sure to check with your insurance provider.   -If you are referred for oral therapy, you will be given a list of specialized dentists to consider or you may choose to visit the Web site of the American Academy of Dental Sleep Medicine  -Oral devices are less likely to work if you have severe sleep apnea or are extremely overweight.     More detailed information  An oral appliance is a small acrylic device that fits over the upper and lower teeth  (similar to a retainer or a mouth guard). This device slightly moves jaw forward, which moves the base of the tongue forward, opens the airway, improves breathing for effective treat snoring and obstructive sleep apnea in perhaps 7 out of 10 people .  The best working devices are custom-made by a dental device   after a mold is made of the teeth 1, 2, 3.  When is an oral appliance indicated?  Oral appliance therapy is recommended as a first-line treatment for patients with primary snoring, mild sleep apnea, and for patients with moderate sleep apnea who prefer appliance therapy to use of CPAP4, 5. Severity of sleep apnea is determined by sleep testing and is based on the number of respiratory events per hour of sleep.   How successful is oral appliance therapy?  The success rate of oral appliance therapy in patients with mild sleep apnea is 75-80% while in patients with moderate sleep apnea it is 50-70%. The chance of success in patients with severe sleep apnea is 40-50%. The research also shows that oral appliances have a beneficial effect on the cardiovascular health of BARRINGTON patients at the same magnitude as CPAP therapy7.  Oral appliances should be a second-line treatment in cases of severe sleep apnea, but if not completely successful then a combination therapy utilizing CPAP plus oral appliance therapy may be effective. Oral appliances tend to be effective in a broad range of patients although studies show that the patients who have the highest success are females, younger patients, those with milder disease, and less severe obesity. 3, 6.   Finding a dentist that practices dental sleep medicine  Specific training is available through the American Academy of Dental Sleep Medicine for dentists interested in working in the field of sleep. To find a dentist who is educated in the field of sleep and the use of oral appliances, near you, visit the Web site of the American Academy of Dental Sleep Medicine.    References  1. Delmar, et al. Objectively measured vs self-reported compliance during oral appliance therapy for sleep-disordered breathing. Chest 2013; 144(5): 5995-0537.  2. Sujit et al. Objective measurement of compliance during oral appliance therapy for sleep-disordered breathing. Thorax  2013; 68(1): 91-96.  3. Tatiana et al. Mandibular advancement devices in 620 men and women with BARRINGTON and snoring: tolerability and predictors of treatment success. Chest 2004; 125: 6938-9658.  4. Evan et al. Oral appliances for snoring and BARRINGTON: a review. Sleep 2006; 29: 244-262.  5. Alcon et al. Oral appliance treatment for BARRINGTON: an update. J Clin Sleep Med 2014; 10(2): 215-227.  6. Binta et al. Predictors of OSAH treatment outcome. J Dent Res 2007; 86: 0821-4133.      Weight Loss:    Weight loss is a long-term strategy that may improve sleep apnea in some patients.    Weight management is a personal decision and the decision should be based on your interest and the potential benefits.  If you are interested in exploring weight loss strategies, the following discussion covers the impact on weight loss on sleep apnea and the approaches that may be successful.    Being overweight does not necessarily mean you will have health consequences.  Those who have BMI over 35 or over 27 with existing medical conditions carries greater risk.   Weight loss decreases severity of sleep apnea in most people with obesity. For those with mild obesity who have developed snoring with weight gain, even 15-30 pound weight loss can improve and occasionally eliminate sleep apnea.  Structured and life-long dietary and health habits are necessary to lose weight and keep healthier weight levels.     Though there may be significant health benefits from weight loss, long-term weight loss is very difficult to achieve- studies show success with dietary management in less than 10% of people. In addition, substantial weight loss may require years of dietary control and may be difficult if patients have severe obesity. In these cases, surgical management may be considered.  Finally, older individuals who have tolerated obesity without health complications may be less likely to benefit from weight loss strategies.       [unfilled]    Surgery:    Surgery for obstructive sleep apnea is considered generally only when other therapies fail to work. Surgery may be discussed with you if you are having a difficult time tolerating CPAP and or when there is an abnormal structure that requires surgical correction.  Nose and throat surgeries often enlarge the airway to prevent collapse.  Most of these surgeries create pain for 1-2 weeks and up to half of the most common surgeries are not effective throughout life.  You should carefully discuss the benefits and drawbacks to surgery with your sleep provider and surgeon to determine if it is the best solution for you.   More information  Surgery for BARRINGTON is directed at areas that are responsible for narrowing or complete obstruction of the airway during sleep.  There are a wide range of procedures available to enlarge and/or stabilize the airway to prevent blockage of breathing in the three major areas where it can occur: the palate, tongue, and nasal regions.  Successful surgical treatment depends on the accurate identification of the factors responsible for obstructive sleep apnea in each person.  A personalized approach is required because there is no single treatment that works well for everyone.  Because of anatomic variation, consultation with an examination by a sleep surgeon is a critical first step in determining what surgical options are best for each patient.  In some cases, examination during sedation may be recommended in order to guide the selection of procedures.  Patients will be counseled about risks and benefits as well as the typical recovery course after surgery. Surgery is typically not a cure for a person s BARRINGTON.  However, surgery will often significantly improve one s BARRINGTON severity (termed  success rate ).  Even in the absence of a cure, surgery will decrease the cardiovascular risk associated with OSA7; improve overall quality of life8 (sleepiness, functionality, sleep  quality, etc).      Palate Procedures:  Patients with BARRINGTON often have narrowing of their airway in the region of their tonsils and uvula.  The goals of palate procedures are to widen the airway in this region as well as to help the tissues resist collapse.  Modern palate procedure techniques focus on tissue conservation and soft tissue rearrangement, rather than tissue removal.  Often the uvula is preserved in this procedure. Residual sleep apnea is common in patient after pharyngoplasty with an average reduction in sleep apnea events of 33%2.      Tongue Procedures:  ExamWhile patients are awake, the muscles that surround the throat are active and keep this region open for breathing. These muscles relax during sleep, allowing the tongue and other structures to collapse and block breathing.  There are several different tongue procedures available.  Selection of a tongue base procedure depends on characteristics seen on physical exam.  Generally, procedures are aimed at removing bulky tissues in this area or preventing the back of the tongue from falling back during sleep.  Success rates for tongue surgery range from 50-62%3.    Hypoglossal Nerve Stimulation:  Hypoglossal nerve stimulation has recently received approval from the [place] Food and Drug Administration for the treatment of obstructive sleep apnea.  This is based on research showing that the system was safe and effective in treating sleep apnea6.  Results showed that the median AHI score decreased 68%, from 29.3 to 9.0. This therapy uses an implant system that senses breathing patterns and delivers mild stimulation to airway muscles, which keeps the airway open during sleep.  The system consists of three fully implanted components: a small generator (similar in size to a pacemaker), a breathing sensor, and a stimulation lead.  Using a small handheld remote, a patient turns the therapy on before bed and off upon awakening.    Candidates for this  device must be greater than 18 years of age, have moderate to severe BARRINGTON (AHI between 15-65), BMI less than 35, have tried CPAP/oral appliance for at least 8 weeks without success, and have appropriate upper airway anatomy (determined by a sleep endoscopy performed by Dr. Chilango Osullivan).    Hypoglossal Nerve Stimulation Pathway:    The sleep surgeon s office will work with the patient through the insurance prior-authorization process (including communications and appeals).    Nasal Procedures:  Nasal obstruction can interfere with nasal breathing during the day and night.  Studies have shown that relief of nasal obstruction can improve the ability of some patients to tolerate positive airway pressure therapy for obstructive sleep apnea1.  Treatment options include medications such as nasal saline, topical corticosteroid and antihistamine sprays, and oral medications such as antihistamines or decongestants. Non-surgical treatments can include external nasal dilators for selected patients. If these are not successful by themselves, surgery can improve the nasal airway either alone or in combination with these other options.      Combination Procedures:  Combination of surgical procedures and other treatments may be recommended, particularly if patients have more than one area of narrowing or persistent positional disease.  The success rate of combination surgery ranges from 66-80%2,3.    References  Freeman BURCH. The Role of the Nose in Snoring and Obstructive Sleep Apnoea: An Update.  Eur Arch Otorhinolaryngol. 2011; 268: 1365-73.   Ju SM; Jennifer JA; Arnel JR; Pallanch JF; Tony MB; Ileana SG; Violette BANKSD. Surgical modifications of the upper airway for obstructive sleep apnea in adults: a systematic review and meta-analysis. SLEEP 2010;33(10):2480-9409. Vy PRUITT. Hypopharyngeal surgery in obstructive sleep apnea: an evidence-based medicine review.  Arch Otolaryngol Head Neck Surg. 2006 Feb;132(2):206-13.  Jose DHALIWAL,  Tamiko Y, James LAURA. The efficacy of anatomically based multilevel surgery for obstructive sleep apnea. Otolaryngol Head Neck Surg. 2003 Oct;129(4):327-35.  Vy PRUITT, Goldberg A. Hypopharyngeal Surgery in Obstructive Sleep Apnea: An Evidence-Based Medicine Review. Arch Otolaryngol Head Neck Surg. 2006 Feb;132(2):206-13.  Bhargav BROOKS et al. Upper-Airway Stimulation for Obstructive Sleep Apnea.  N Engl J Med. 2014 Jan 9;370(2):139-49.  Maria R Y et al. Increased Incidence of Cardiovascular Disease in Middle-aged Men with Obstructive Sleep Apnea. Am J Respir Crit Care Med; 2002 166: 159-165  Crenshaw EM et al. Studying Life Effects and Effectiveness of Palatopharyngoplasty (SLEEP) study: Subjective Outcomes of Isolated Uvulopalatopharyngoplasty. Otolaryngol Head Neck Surg. 2011; 144: 623-631.        WHAT IF I ONLY HAVE SNORING?    Mandibular advancement devices, lateral sleep positioning, long-term weight loss and treatment of nasal allergies have been shown to improve snoring.  Exercising tongue muscles with a game (LiquidHubttps://apps.CHNL/us/gina/soundly-reduce-snoring/vy1733856341) or stimulating the tongue during the day with a device (https://doi.org/10.3390/fik02457886) have improved snoring in some individuals.    Remember to Drive Safe... Drive Alive     Sleep health profoundly affects your health, mood, and your safety.  Thirty three percent of the population (one in three of us) is not getting enough sleep and many have a sleep disorder. Not getting enough sleep or having an untreated / undertreated sleep condition may make us sleepy without even knowing it. In fact, our driving could be dramatically impaired due to our sleep health. As your provider, here are some things I would like you to know about driving:     Here are some warning signs for impairment and dangerous drowsy driving:              -Having been awake more than 16 hours               -Looking tired               -Eyelid drooping              -Head  nodding (it could be too late at this point)              -Driving for more than 30 minutes     Some things you could do to make the driving safer if you are experiencing some drowsiness:              -Stop driving and rest              -Call for transportation              -Make sure your sleep disorder is adequately treated     Some things that have been shown NOT to work when experiencing drowsiness while driving:              -Turning on the radio              -Opening windows              -Eating any  distracting  /  entertaining  foods (e.g., sunflower seeds, candy, or any other)              -Talking on the phone      Your decision may not only impact your life, but also the life of others. Please, remember to drive safe for yourself and all of us.

## 2023-02-17 ENCOUNTER — TELEPHONE (OUTPATIENT)
Dept: SLEEP MEDICINE | Facility: CLINIC | Age: 58
End: 2023-02-17
Payer: COMMERCIAL

## 2023-02-17 DIAGNOSIS — I10 ESSENTIAL HYPERTENSION: ICD-10-CM

## 2023-02-17 DIAGNOSIS — R06.83 SNORING: ICD-10-CM

## 2023-02-17 DIAGNOSIS — G47.00 INSOMNIA, UNSPECIFIED TYPE: ICD-10-CM

## 2023-02-17 DIAGNOSIS — R25.8 NOCTURNAL LEG MOVEMENTS: ICD-10-CM

## 2023-02-17 DIAGNOSIS — G47.19 EXCESSIVE DAYTIME SLEEPINESS: ICD-10-CM

## 2023-02-17 DIAGNOSIS — R06.81 WITNESSED APNEIC SPELLS: Primary | ICD-10-CM

## 2023-02-17 NOTE — TELEPHONE ENCOUNTER
Patient left message for the hospitals clinic, requesting orders be changed to an HST. Patient will be paying out of pocket and is requesting the cost, patient was given the cost estimate phone number and CPT code for an HST.   Ph. [phone]; CPT . Patient also requesting the cost of a cpap device, she was given the phone number to [facility] for that information. PH. [phone].

## 2023-03-22 ASSESSMENT — SLEEP AND FATIGUE QUESTIONNAIRES
HOW LIKELY ARE YOU TO NOD OFF OR FALL ASLEEP IN A CAR, WHILE STOPPED FOR A FEW MINUTES IN TRAFFIC: WOULD NEVER DOZE
HOW LIKELY ARE YOU TO NOD OFF OR FALL ASLEEP WHILE SITTING AND TALKING TO SOMEONE: SLIGHT CHANCE OF DOZING
HOW LIKELY ARE YOU TO NOD OFF OR FALL ASLEEP WHILE SITTING QUIETLY AFTER LUNCH WITHOUT ALCOHOL: MODERATE CHANCE OF DOZING
HOW LIKELY ARE YOU TO NOD OFF OR FALL ASLEEP WHILE SITTING AND READING: MODERATE CHANCE OF DOZING
HOW LIKELY ARE YOU TO NOD OFF OR FALL ASLEEP WHEN YOU ARE A PASSENGER IN A CAR FOR AN HOUR WITHOUT A BREAK: MODERATE CHANCE OF DOZING
HOW LIKELY ARE YOU TO NOD OFF OR FALL ASLEEP WHILE SITTING INACTIVE IN A PUBLIC PLACE: SLIGHT CHANCE OF DOZING
HOW LIKELY ARE YOU TO NOD OFF OR FALL ASLEEP WHILE LYING DOWN TO REST IN THE AFTERNOON WHEN CIRCUMSTANCES PERMIT: MODERATE CHANCE OF DOZING
HOW LIKELY ARE YOU TO NOD OFF OR FALL ASLEEP WHILE WATCHING TV: MODERATE CHANCE OF DOZING

## 2023-03-26 ENCOUNTER — HEALTH MAINTENANCE LETTER (OUTPATIENT)
Age: 58
End: 2023-03-26

## 2023-03-28 ENCOUNTER — OFFICE VISIT (OUTPATIENT)
Dept: SLEEP MEDICINE | Facility: CLINIC | Age: 58
End: 2023-03-28
Payer: COMMERCIAL

## 2023-03-28 DIAGNOSIS — G47.19 EXCESSIVE DAYTIME SLEEPINESS: ICD-10-CM

## 2023-03-28 DIAGNOSIS — R25.8 NOCTURNAL LEG MOVEMENTS: ICD-10-CM

## 2023-03-28 DIAGNOSIS — G47.00 INSOMNIA, UNSPECIFIED TYPE: ICD-10-CM

## 2023-03-28 DIAGNOSIS — I10 ESSENTIAL HYPERTENSION: ICD-10-CM

## 2023-03-28 DIAGNOSIS — R06.83 SNORING: ICD-10-CM

## 2023-03-28 DIAGNOSIS — R06.81 WITNESSED APNEIC SPELLS: ICD-10-CM

## 2023-03-28 PROCEDURE — G0399 HOME SLEEP TEST/TYPE 3 PORTA: HCPCS | Performed by: INTERNAL MEDICINE

## 2023-03-28 NOTE — PROGRESS NOTES
Pt is completing a home sleep test. Pt was instructed on how to put on the Noxturnal T3 device and associated equipment before going to bed and given the opportunity to practice putting it on before leaving the sleep center. Pt was reminded to bring the home sleep test kit back to the center tomorrow, at agreed upon time for download and reporting.   Neck circumference: 44.5 CM / 17.5 inches.

## 2023-03-29 ENCOUNTER — DOCUMENTATION ONLY (OUTPATIENT)
Dept: SLEEP MEDICINE | Facility: CLINIC | Age: 58
End: 2023-03-29
Payer: COMMERCIAL

## 2023-03-29 NOTE — PROGRESS NOTES
This HSAT was performed using a Noxturnal T3 device which recorded snore, sound, movement activity, body position, nasal pressure, oronasal thermal airflow, pulse, oximetry and both chest and abdominal respiratory effort. HSAT data was restricted to the time patient states they were in bed.     HSAT was scored using 1B 4% hypopnea rule.     HST AHI (Non-PAT): 11.7  Snoring was reported as loud.  Time with SpO2 below 89% was 3.1 minutes.   Overall signal quality was good.     Pt will follow up with sleep provider to determine appropriate therapy.

## 2023-03-29 NOTE — PROCEDURES
"HOME SLEEP STUDY INTERPRETATION        Patient: Jayda Berrios  MRN: [id number]  YOB: 1965  Study Date: 3/28/2023  PCP/Referring Provider: Jessy García;   Ordering Provider: Seble Agarwal PA-C         Indications for Home Study: Jayda Berrios is a 57 year old female who presents with symptoms suggestive of obstructive sleep apnea.    Estimated body mass index is 31.09 kg/m  as calculated from the following:    Height as of 23: 1.575 m (5' 2\").    Weight as of 23: 77.1 kg (170 lb).  Total score - [place]: 12 (3/22/2023  1:46 PM)  STOP-BAN/8        Data: A full night home sleep study was performed recording the standard physiologic parameters including body position, movement, sound, nasal pressure, thermal oral airflow, chest and abdominal movements with respiratory inductance plethysmography, and oxygen saturation by pulse oximetry. Pulse rate was estimated by oximetry recording. This study was considered adequate based on > 4 hours of quality oximetry and respiratory recording. As specified by the AASM Manual for the Scoring of Sleep and Associated events, version 2.3, Rule VIII.D 1B, 4% oxygen desaturation scoring for hypopneas is used as a standard of care on all home sleep apnea testing.        Analysis Time:  592.5 minutes        Respiration:   Sleep Associated Hypoxemia: sustained hypoxemia was not present. Baseline oxygen saturation was 95%.  Time with saturation less than or equal to 88% was 3.1 minutes. The lowest oxygen saturation was 81%.   Snoring: Snoring was present.  Respiratory events: The home study revealed a presence of 12 obstructive apneas and 1 mixed and central apneas. There were 102 hypopneas resulting in a combined apnea/hypopnea index [AHI] of 11.7 events per hour.  AHI was 14.3 per hour supine, N/A per hour prone, 9.9 per hour on left side, and 3.6 per hour on right side.   Pattern: Excluding events noted above, respiratory rate and pattern was " Normal.      Position: Percent of time spent: supine - 57.5%, prone - 0%, on left - 30.6%, on right - 11.2%.      Heart Rate: By pulse oximetry normal rate was noted.       Assessment:     Mild obstructive sleep apnea.    Sleep associated hypoxemia was not present.    Recommendations:    Depending on clinical indications for treatment of mild obstructive sleep apnea, following therapy options can be considered.    If there is excessive daytime sleepiness or other qualifying medical comorbidity, consider auto titrating CPAP therapy for treatment.    Alternatively, patient can consider oral appliance therapy through referral to dental sleep medicine.    Suggest optimizing sleep hygiene and avoiding sleep deprivation.    Weight management.        Diagnosis Code(s): Obstructive Sleep Apnea G47.33    Alfredo Avendano MD, March 29, 2023   Diplomate, American Board of Psychiatry and Neurology, Sleep Medicine

## 2023-03-29 NOTE — PROGRESS NOTES
HST POST-STUDY QUESTIONNAIRE    1. What time did you go to bed?  10:25pm  2. How long do you think it took to fall asleep?  1/2 hour  3. What time did you wake up to start the day?  8:30am  4. Did you get up during the night at all?  Yes twice  5. If you woke up, do you remember approximately what time(s)? 2:15am and 7:30am.  6. Did you have any difficulty with the equipment?  No  7. Did you us any type of treatment with this study?  None  8. Was the head of the bed elevated? No  9. Did you sleep in a recliner?  No  10. Did you stop using CPAP at least 3 days before this test?  NA  11. Any other information you'd like us to know?

## 2023-03-29 NOTE — NURSING NOTE
Patient requested that PSG be changed to a HST as she will have to pay out-of-pocket for service.  HST pick-up, HST drop-off, and follow-up with provider have all been scheduled.  Peyton Pagan, CMA

## 2023-04-06 DIAGNOSIS — E11.40 TYPE 2 DIABETES MELLITUS WITH DIABETIC NEUROPATHY, WITH LONG-TERM CURRENT USE OF INSULIN (H): ICD-10-CM

## 2023-04-06 DIAGNOSIS — Z79.4 TYPE 2 DIABETES MELLITUS WITH DIABETIC NEUROPATHY, WITH LONG-TERM CURRENT USE OF INSULIN (H): ICD-10-CM

## 2023-04-06 RX ORDER — INSULIN GLARGINE 100 [IU]/ML
INJECTION, SOLUTION SUBCUTANEOUS
Qty: 15 ML | Refills: 6 | Status: SHIPPED | OUTPATIENT
Start: 2023-04-06 | End: 2023-07-10

## 2023-04-10 DIAGNOSIS — Z90.710 S/P HYSTERECTOMY: ICD-10-CM

## 2023-04-11 RX ORDER — ESTRADIOL 2 MG/1
TABLET ORAL
Qty: 90 TABLET | Refills: 1 | Status: SHIPPED | OUTPATIENT
Start: 2023-04-11 | End: 2023-11-07

## 2023-05-16 ASSESSMENT — SLEEP AND FATIGUE QUESTIONNAIRES
HOW LIKELY ARE YOU TO NOD OFF OR FALL ASLEEP WHILE SITTING AND READING: MODERATE CHANCE OF DOZING
HOW LIKELY ARE YOU TO NOD OFF OR FALL ASLEEP WHILE WATCHING TV: MODERATE CHANCE OF DOZING
HOW LIKELY ARE YOU TO NOD OFF OR FALL ASLEEP WHILE SITTING QUIETLY AFTER LUNCH WITHOUT ALCOHOL: MODERATE CHANCE OF DOZING
HOW LIKELY ARE YOU TO NOD OFF OR FALL ASLEEP IN A CAR, WHILE STOPPED FOR A FEW MINUTES IN TRAFFIC: WOULD NEVER DOZE
HOW LIKELY ARE YOU TO NOD OFF OR FALL ASLEEP WHILE LYING DOWN TO REST IN THE AFTERNOON WHEN CIRCUMSTANCES PERMIT: MODERATE CHANCE OF DOZING
HOW LIKELY ARE YOU TO NOD OFF OR FALL ASLEEP WHILE SITTING AND TALKING TO SOMEONE: SLIGHT CHANCE OF DOZING
HOW LIKELY ARE YOU TO NOD OFF OR FALL ASLEEP WHEN YOU ARE A PASSENGER IN A CAR FOR AN HOUR WITHOUT A BREAK: HIGH CHANCE OF DOZING
HOW LIKELY ARE YOU TO NOD OFF OR FALL ASLEEP WHILE SITTING INACTIVE IN A PUBLIC PLACE: SLIGHT CHANCE OF DOZING

## 2023-05-22 NOTE — PROGRESS NOTES
"[facility]   Outpatient Sleep Medicine  May 23, 2023       Name: Jayda Berrios MRN# [id number]   Age: 57 year old YOB: 1965            Assessment and Plan:   1. B[place] (obstructive sleep apnea)  2. Excessive daytime sleepiness  3. Essential hypertension    During this visit, we reviewed the summary of the study including position, event distribution, oximetry and heart rate. Mild obstructive sleep apnea was seen with AHI 11.7. Sleep associated hypoxemia was not present.    Discussed potential treatment options for mild sleep apnea including CPAP, mandibular advancement device, positional restriction and lastly consideration of surgical options. Also discussed option of no treatment as this degree of BARRINGTON not been proved to place patient at risk of increased long term cardiovascular morbidity/mortality.     Patient would like either CPAP or MAD. Wants to call insurance first to see what is best covered. Will reach out via [employer] after talking with insurance and appropriate orders will be placed.  Follow-up pending treatment option chosen.       Chief Complaint      Chief Complaint   Patient presents with     RECHECK          History of Present Illness:   Jayda Berrios is a 57 year old female who presents to the clinic for results of recent sleep study completed on 3/28/23. Study was completed to evaluate for sleep apnea.      Reviewed results of sleep study with patient as follows:    HOME SLEEP STUDY INTERPRETATION   Estimated body mass index is 31.09 kg/m  as calculated from the following:    Height as of 23: 1.575 m (5' 2\").    Weight as of 23: 77.1 kg (170 lb).  Total score - [place]: 12 (3/22/2023  1:46 PM)  STOP-BAN/8     Analysis Time:  592.5 minutes     Respiration:   Sleep Associated Hypoxemia: sustained hypoxemia was not present. Baseline oxygen saturation was 95%.  Time with saturation less than or equal to 88% was 3.1 minutes. The lowest oxygen saturation " "was 81%.   Snoring: Snoring was present.  Respiratory events: The home study revealed a presence of 12 obstructive apneas and 1 mixed and central apneas. There were 102 hypopneas resulting in a combined apnea/hypopnea index [AHI] of 11.7 events per hour.  AHI was 14.3 per hour supine, N/A per hour prone, 9.9 per hour on left side, and 3.6 per hour on right side.   Pattern: Excluding events noted above, respiratory rate and pattern was Normal.     Position: Percent of time spent: supine - 57.5%, prone - 0%, on left - 30.6%, on right - 11.2%.     Heart Rate: By pulse oximetry normal rate was noted.      Past medical/surgical history, family history, social history, medications and allergies were reviewed.           Physical Examination:   Ht 1.6 m (5' 3\")   Wt 74.8 kg (165 lb)   BMI 29.23 kg/m    General appearance: Awake, alert, cooperative. Well groomed. Sitting comfortably in chair. In no apparent distress.  HEENT: Head: Normocephalic, atraumatic. Eyes:Conjunctiva clear. Sclera normal. Nose: External appearance without deformity.   Pulmonary:  Able to speak easily in full sentences. No cough or wheeze.   Skin:  No rashes or significant lesions on visible skin.   Neurologic: Alert, oriented x3.   Psychiatric: Mood euthymic. Affect congruent with full range and intensity.      CC:  Jessy García PA-C  May 23, 2023     [facility]  [address] [place] [address]     [facility]  [address] [address]    Chart documentation was completed, in part, with [employer] voice-recognition software. Even though reviewed, some grammatical, spelling, and word errors may remain.    28 minutes spent on day of encounter doing chart review, history and exam, documentation, and further activities as noted above  "

## 2023-05-23 ENCOUNTER — VIRTUAL VISIT (OUTPATIENT)
Dept: SLEEP MEDICINE | Facility: CLINIC | Age: 58
End: 2023-05-23
Payer: COMMERCIAL

## 2023-05-23 VITALS — WEIGHT: 165 LBS | HEIGHT: 63 IN | BODY MASS INDEX: 29.23 KG/M2

## 2023-05-23 DIAGNOSIS — G47.19 EXCESSIVE DAYTIME SLEEPINESS: ICD-10-CM

## 2023-05-23 DIAGNOSIS — G47.33 OSA (OBSTRUCTIVE SLEEP APNEA): Primary | ICD-10-CM

## 2023-05-23 DIAGNOSIS — I10 ESSENTIAL HYPERTENSION: ICD-10-CM

## 2023-05-23 PROCEDURE — 99213 OFFICE O/P EST LOW 20 MIN: CPT | Mod: VID | Performed by: PHYSICIAN ASSISTANT

## 2023-05-23 ASSESSMENT — PAIN SCALES - GENERAL: PAINLEVEL: NO PAIN (0)

## 2023-05-23 NOTE — NURSING NOTE
Is the patient currently in the state of [place]? YES    Visit mode:VIDEO    If the visit is dropped, the patient can be reconnected by: VIDEO VISIT: Send to e-mail at: [email or web address]    Will anyone else be joining the visit? NO      How would you like to obtain your AVS? MyChart    Are changes needed to the allergy or medication list? NO    Reason for visit: STEVE Salas

## 2023-05-23 NOTE — PATIENT INSTRUCTIONS
"HOME SLEEP STUDY INTERPRETATION           Patient: Jayda Berrios  MRN: [id number]  YOB: 1965  Study Date: 3/28/2023  PCP/Referring Provider: Jessy García;   Ordering Provider: Seble Agarwal PA-C           Indications for Home Study: Jayda Berrios is a 57 year old female who presents with symptoms suggestive of obstructive sleep apnea.     Estimated body mass index is 31.09 kg/m  as calculated from the following:    Height as of 23: 1.575 m (5' 2\").    Weight as of 23: 77.1 kg (170 lb).  Total score - [place]: 12 (3/22/2023  1:46 PM)  STOP-BAN/8           Data: A full night home sleep study was performed recording the standard physiologic parameters including body position, movement, sound, nasal pressure, thermal oral airflow, chest and abdominal movements with respiratory inductance plethysmography, and oxygen saturation by pulse oximetry. Pulse rate was estimated by oximetry recording. This study was considered adequate based on > 4 hours of quality oximetry and respiratory recording. As specified by the AASM Manual for the Scoring of Sleep and Associated events, version 2.3, Rule VIII.D 1B, 4% oxygen desaturation scoring for hypopneas is used as a standard of care on all home sleep apnea testing.           Analysis Time:  592.5 minutes           Respiration:   Sleep Associated Hypoxemia: sustained hypoxemia was not present. Baseline oxygen saturation was 95%.  Time with saturation less than or equal to 88% was 3.1 minutes. The lowest oxygen saturation was 81%.   Snoring: Snoring was present.  Respiratory events: The home study revealed a presence of 12 obstructive apneas and 1 mixed and central apneas. There were 102 hypopneas resulting in a combined apnea/hypopnea index [AHI] of 11.7 events per hour.  AHI was 14.3 per hour supine, N/A per hour prone, 9.9 per hour on left side, and 3.6 per hour on right side.   Pattern: Excluding events noted above, respiratory rate and " pattern was Normal.        Position: Percent of time spent: supine - 57.5%, prone - 0%, on left - 30.6%, on right - 11.2%.        Heart Rate: By pulse oximetry normal rate was noted.         Assessment:   Mild obstructive sleep apnea.  Sleep associated hypoxemia was not present.     Recommendations:  Depending on clinical indications for treatment of mild obstructive sleep apnea, following therapy options can be considered.  If there is excessive daytime sleepiness or other qualifying medical comorbidity, consider auto titrating CPAP therapy for treatment.  Alternatively, patient can consider oral appliance therapy through referral to dental sleep medicine.  Suggest optimizing sleep hygiene and avoiding sleep deprivation.  Weight management.

## 2023-05-23 NOTE — PROGRESS NOTES
Virtual Visit Details    Type of service:  Video Visit   Video visit start time 12:00PM  Video visit end time 12:20PM  Originating Location (pt. Location): Home    Distant Location (provider location):  Off-site  Platform used for Video Visit: Demetrius

## 2023-07-05 ENCOUNTER — MYC MEDICAL ADVICE (OUTPATIENT)
Dept: FAMILY MEDICINE | Facility: CLINIC | Age: 58
End: 2023-07-05
Payer: COMMERCIAL

## 2023-07-05 DIAGNOSIS — E11.40 TYPE 2 DIABETES MELLITUS WITH DIABETIC NEUROPATHY, WITH LONG-TERM CURRENT USE OF INSULIN (H): ICD-10-CM

## 2023-07-05 DIAGNOSIS — Z79.4 TYPE 2 DIABETES MELLITUS WITH DIABETIC NEUROPATHY, WITH LONG-TERM CURRENT USE OF INSULIN (H): ICD-10-CM

## 2023-07-06 NOTE — TELEPHONE ENCOUNTER
Routing refill request to provider for review/approval because:   Short Acting Insulin Protocol Failed 07/05/2023 08:45 PM   Protocol Details  HgbA1C in past 3 or 6 months    Recent (6 mo) or future (30 days) visit within the authorizing provider's specialty        Lab Results   Component Value Date    A1C 6.1 12/07/2022    A1C 5.8 09/13/2022    A1C 6.2 03/15/2022    A1C 6.8 12/14/2021

## 2023-07-10 DIAGNOSIS — E11.40 TYPE 2 DIABETES MELLITUS WITH DIABETIC NEUROPATHY, WITH LONG-TERM CURRENT USE OF INSULIN (H): ICD-10-CM

## 2023-07-10 DIAGNOSIS — Z79.4 TYPE 2 DIABETES MELLITUS WITH DIABETIC NEUROPATHY, WITH LONG-TERM CURRENT USE OF INSULIN (H): ICD-10-CM

## 2023-07-10 RX ORDER — INSULIN GLARGINE 100 [IU]/ML
INJECTION, SOLUTION SUBCUTANEOUS
Qty: 15 ML | Refills: 3 | Status: SHIPPED | OUTPATIENT
Start: 2023-07-10 | End: 2023-11-07

## 2023-07-27 ENCOUNTER — OFFICE VISIT (OUTPATIENT)
Dept: FAMILY MEDICINE | Facility: CLINIC | Age: 58
End: 2023-07-27
Payer: COMMERCIAL

## 2023-07-27 ENCOUNTER — ANCILLARY PROCEDURE (OUTPATIENT)
Dept: GENERAL RADIOLOGY | Facility: CLINIC | Age: 58
End: 2023-07-27
Attending: NURSE PRACTITIONER
Payer: COMMERCIAL

## 2023-07-27 VITALS
RESPIRATION RATE: 16 BRPM | BODY MASS INDEX: 31.01 KG/M2 | WEIGHT: 175 LBS | HEIGHT: 63 IN | TEMPERATURE: 97.9 F | HEART RATE: 87 BPM | DIASTOLIC BLOOD PRESSURE: 80 MMHG | SYSTOLIC BLOOD PRESSURE: 136 MMHG | OXYGEN SATURATION: 98 %

## 2023-07-27 DIAGNOSIS — R10.84 ABDOMINAL PAIN, GENERALIZED: ICD-10-CM

## 2023-07-27 DIAGNOSIS — N18.32 STAGE 3B CHRONIC KIDNEY DISEASE (H): ICD-10-CM

## 2023-07-27 DIAGNOSIS — K62.3 RECTAL PROLAPSE: Primary | ICD-10-CM

## 2023-07-27 PROCEDURE — 99213 OFFICE O/P EST LOW 20 MIN: CPT | Performed by: NURSE PRACTITIONER

## 2023-07-27 PROCEDURE — 74019 RADEX ABDOMEN 2 VIEWS: CPT | Mod: TC | Performed by: RADIOLOGY

## 2023-07-27 NOTE — PROGRESS NOTES
"  Assessment & Plan     Rectal prolapse  - Adult Colorectal Surgery [facility] Referral - [facility] ([facility])    Abdominal pain, generalized  - XR Abdomen 2 Views    Stage 3b chronic kidney disease (H)  Recheck labs when needed. Follows nephrology            BMI:   Estimated body mass index is 31 kg/m  as calculated from the following:    Height as of this encounter: 1.6 m (5' 3\").    Weight as of this encounter: 79.4 kg (175 lb).           Claudine Najera, [facility] PRIOR YESENIA Montelongo is a 57 year old, presenting for the following health issues:  Rectal Problem      7/27/2023     1:10 PM   Additional Questions   Roomed by claudine phillips   Accompanied by self         7/27/2023     1:10 PM   Patient Reported Additional Medications   Patient reports taking the following new medications none       History of Present Illness       Diabetes:   She presents for follow up of diabetes.  She is checking home blood glucose three times daily.   She checks blood glucose before and after meals and at bedtime.  Blood glucose is never over 200 and sometimes under 70. She is aware of hypoglycemia symptoms including weakness and confusion.   She is concerned about low blood sugar, several less than 70 in the past few weeks.   She is having numbness in feet and burning in feet.  The patient has not had a diabetic eye exam in the last 12 months.          She eats 2-3 servings of fruits and vegetables daily.She consumes 2 sweetened beverage(s) daily.She exercises with enough effort to increase her heart rate 20 to 29 minutes per day.  She exercises with enough effort to increase her heart rate 5 days per week.   She is taking medications regularly.       Hemorrhoids  Onset/Duration: 5 days  Description:   Karena-anal lump: YES  Pain: YES  Itching: No  Feels nausea  Accompanying Signs & Symptoms:  Blood in stool: No  Changes in stool pattern: YES   History:   Any previous GI studies done:has had " "colonoscopy in the past  Family History of colon cancer: No  Precipitating factors:   None  Alleviating factors:  Preperation H, imodium, tylenol (all not helpful)  Therapies tried and outcome: none        Review of Systems   Constitutional, HEENT, cardiovascular, pulmonary, GI, , musculoskeletal, neuro, skin, endocrine and psych systems are negative, except as otherwise noted.      Objective    /80   Pulse 87   Temp 97.9  F (36.6  C)   Resp 16   Ht 1.6 m (5' 3\")   Wt 79.4 kg (175 lb)   SpO2 98%   BMI 31.00 kg/m    Body mass index is 31 kg/m .  Physical Exam   GENERAL: healthy, alert and no distress  NECK: no adenopathy, no asymmetry, masses, or scars and thyroid normal to palpation  RESP: lungs clear to auscultation - no rales, rhonchi or wheezes  CV: regular rate and rhythm, normal S1 S2, no S3 or S4, no murmur, click or rub, no peripheral edema and peripheral pulses strong  ABDOMEN: soft, nontender, no hepatosplenomegaly, no masses and bowel sounds normal  RECTAL (female): rectal prolapse  MS: no gross musculoskeletal defects noted, no edema              DALLAS Carter     [address] [address]  [email or web address]   Office: [phone]                     "

## 2023-08-14 DIAGNOSIS — E11.65 TYPE 2 DIABETES MELLITUS WITH HYPERGLYCEMIA, WITH LONG-TERM CURRENT USE OF INSULIN (H): ICD-10-CM

## 2023-08-14 DIAGNOSIS — Z79.4 TYPE 2 DIABETES MELLITUS WITH HYPERGLYCEMIA, WITH LONG-TERM CURRENT USE OF INSULIN (H): ICD-10-CM

## 2023-08-14 RX ORDER — BLOOD SUGAR DIAGNOSTIC
STRIP MISCELLANEOUS
Qty: 200 STRIP | Refills: 3 | OUTPATIENT
Start: 2023-08-14

## 2023-08-26 ENCOUNTER — HEALTH MAINTENANCE LETTER (OUTPATIENT)
Age: 58
End: 2023-08-26

## 2023-09-01 ENCOUNTER — MYC MEDICAL ADVICE (OUTPATIENT)
Dept: FAMILY MEDICINE | Facility: CLINIC | Age: 58
End: 2023-09-01
Payer: COMMERCIAL

## 2023-09-01 DIAGNOSIS — E11.65 TYPE 2 DIABETES MELLITUS WITH HYPERGLYCEMIA, WITH LONG-TERM CURRENT USE OF INSULIN (H): ICD-10-CM

## 2023-09-01 DIAGNOSIS — Z79.4 TYPE 2 DIABETES MELLITUS WITH HYPERGLYCEMIA, WITH LONG-TERM CURRENT USE OF INSULIN (H): ICD-10-CM

## 2023-09-06 RX ORDER — BLOOD SUGAR DIAGNOSTIC
STRIP MISCELLANEOUS
Qty: 400 STRIP | Refills: 3 | Status: SHIPPED | OUTPATIENT
Start: 2023-09-06

## 2023-09-30 DIAGNOSIS — I10 HYPERTENSION GOAL BP (BLOOD PRESSURE) < 140/90: ICD-10-CM

## 2023-10-02 RX ORDER — AMLODIPINE BESYLATE 10 MG/1
TABLET ORAL
Qty: 90 TABLET | Refills: 0 | Status: SHIPPED | OUTPATIENT
Start: 2023-10-02 | End: 2023-11-07

## 2023-10-31 ASSESSMENT — ENCOUNTER SYMPTOMS
FREQUENCY: 0
HEMATURIA: 0
HEARTBURN: 1
SORE THROAT: 0
NERVOUS/ANXIOUS: 0
CHILLS: 0
NAUSEA: 0
COUGH: 0
DIZZINESS: 1
BREAST MASS: 0
CONSTIPATION: 1
DYSURIA: 0
ABDOMINAL PAIN: 0
DIARRHEA: 0
PALPITATIONS: 0
SHORTNESS OF BREATH: 0
MYALGIAS: 0
WEAKNESS: 1
JOINT SWELLING: 1
EYE PAIN: 0
FEVER: 0
PARESTHESIAS: 0
HEMATOCHEZIA: 0
HEADACHES: 1

## 2023-10-31 NOTE — COMMUNITY RESOURCES LIST (ENGLISH)
10/31/2023   [facility] [place]  N/A  For questions about this resource list or additional care needs, please contact your primary care clinic or care manager.  Phone: [phone]   Email: N/A   Address: [address] [place] [address]   Hours: N/A        Financial Stability       Utility payment assistance  1  [facility] [place] [facility] Distance: 5.31 miles      In-Person, Phone/Virtual   [address] [place] [address]  Language: English  Hours: Mon 4:00 PM - 6:00 PM , Tue 11:00 AM - 1:00 PM , Wed 4:00 PM - 6:00 PM , Thu 10:30 AM - 12:30 PM  Fees: Free   Phone: (383) 722-4523 Email: resources@[employer].org Website: [email or web address]/[place]/mission-outpost/     2  [employer] ([facility]) [facility] - Energy Assistance Program (EAP) Distance: 7.71 miles      Phone/Virtual   [address] [place] [address] [facility]379  Language: English, Romanian  Hours: Mon - Fri 8:00 AM - 4:30 PM  Fees: Free   Phone: (904) 689-7446 Email: info@[employer].org Website: https://www.capagency.org/          Important Numbers & Websites       Emergency Services   911  [facility]   311  Poison Control   (335) 223-2829  Suicide Prevention Lifeline   (516) 564-1948 (TALK)  Child Abuse Hotline   (840) 667-7650 (4-A-Child)  Sexual Assault Hotline   (440) 776-8529 (HOPE)  National Runaway Safeline   (189) 508-6046 (RUNAWAY)  All-Options Talkline   (518) 591-6952  Substance Abuse Referral   (999) 578-5220 (HELP)

## 2023-11-03 DIAGNOSIS — E11.42 DIABETIC PERIPHERAL NEUROPATHY (H): ICD-10-CM

## 2023-11-04 ENCOUNTER — HEALTH MAINTENANCE LETTER (OUTPATIENT)
Age: 58
End: 2023-11-04

## 2023-11-07 ENCOUNTER — OFFICE VISIT (OUTPATIENT)
Dept: FAMILY MEDICINE | Facility: CLINIC | Age: 58
End: 2023-11-07
Payer: COMMERCIAL

## 2023-11-07 VITALS
TEMPERATURE: 97.3 F | HEIGHT: 63 IN | BODY MASS INDEX: 32.14 KG/M2 | OXYGEN SATURATION: 100 % | RESPIRATION RATE: 18 BRPM | DIASTOLIC BLOOD PRESSURE: 66 MMHG | HEART RATE: 81 BPM | WEIGHT: 181.4 LBS | SYSTOLIC BLOOD PRESSURE: 126 MMHG

## 2023-11-07 DIAGNOSIS — Z13.9 ENCOUNTER FOR SCREENING INVOLVING SOCIAL DETERMINANTS OF HEALTH (SDOH): ICD-10-CM

## 2023-11-07 DIAGNOSIS — E11.42 DIABETIC PERIPHERAL NEUROPATHY (H): ICD-10-CM

## 2023-11-07 DIAGNOSIS — B35.1 ONYCHOMYCOSIS: ICD-10-CM

## 2023-11-07 DIAGNOSIS — Z12.31 VISIT FOR SCREENING MAMMOGRAM: ICD-10-CM

## 2023-11-07 DIAGNOSIS — E11.40 TYPE 2 DIABETES MELLITUS WITH DIABETIC NEUROPATHY, WITH LONG-TERM CURRENT USE OF INSULIN (H): ICD-10-CM

## 2023-11-07 DIAGNOSIS — Z00.00 ROUTINE GENERAL MEDICAL EXAMINATION AT A HEALTH CARE FACILITY: Primary | ICD-10-CM

## 2023-11-07 DIAGNOSIS — E78.5 HYPERLIPIDEMIA LDL GOAL <100: ICD-10-CM

## 2023-11-07 DIAGNOSIS — Z79.4 TYPE 2 DIABETES MELLITUS WITH DIABETIC NEUROPATHY, WITH LONG-TERM CURRENT USE OF INSULIN (H): ICD-10-CM

## 2023-11-07 DIAGNOSIS — N18.32 STAGE 3B CHRONIC KIDNEY DISEASE (H): ICD-10-CM

## 2023-11-07 DIAGNOSIS — Z90.710 S/P HYSTERECTOMY: ICD-10-CM

## 2023-11-07 DIAGNOSIS — Z12.11 SCREEN FOR COLON CANCER: ICD-10-CM

## 2023-11-07 DIAGNOSIS — M62.830 LUMBAR PARASPINAL MUSCLE SPASM: ICD-10-CM

## 2023-11-07 DIAGNOSIS — I10 HYPERTENSION GOAL BP (BLOOD PRESSURE) < 140/90: ICD-10-CM

## 2023-11-07 PROCEDURE — 99396 PREV VISIT EST AGE 40-64: CPT | Mod: 25 | Performed by: NURSE PRACTITIONER

## 2023-11-07 PROCEDURE — 90471 IMMUNIZATION ADMIN: CPT | Performed by: NURSE PRACTITIONER

## 2023-11-07 PROCEDURE — 90677 PCV20 VACCINE IM: CPT | Performed by: NURSE PRACTITIONER

## 2023-11-07 PROCEDURE — 99214 OFFICE O/P EST MOD 30 MIN: CPT | Mod: 25 | Performed by: NURSE PRACTITIONER

## 2023-11-07 RX ORDER — ESTRADIOL 2 MG/1
2 TABLET ORAL DAILY
Qty: 90 TABLET | Refills: 3 | Status: SHIPPED | OUTPATIENT
Start: 2023-11-07 | End: 2024-03-26

## 2023-11-07 RX ORDER — FENOFIBRATE 160 MG/1
TABLET ORAL
Qty: 90 TABLET | Refills: 3 | Status: SHIPPED | OUTPATIENT
Start: 2023-11-07 | End: 2024-03-26

## 2023-11-07 RX ORDER — CYCLOBENZAPRINE HCL 5 MG
5 TABLET ORAL 3 TIMES DAILY PRN
Qty: 30 TABLET | Refills: 1 | Status: SHIPPED | OUTPATIENT
Start: 2023-11-07

## 2023-11-07 RX ORDER — INSULIN GLARGINE 100 [IU]/ML
24 INJECTION, SOLUTION SUBCUTANEOUS DAILY
Qty: 15 ML | Refills: 3 | Status: SHIPPED | OUTPATIENT
Start: 2023-11-07

## 2023-11-07 RX ORDER — AMLODIPINE BESYLATE 10 MG/1
10 TABLET ORAL DAILY
Qty: 90 TABLET | Refills: 3 | Status: SHIPPED | OUTPATIENT
Start: 2023-11-07 | End: 2024-02-28

## 2023-11-07 RX ORDER — CARVEDILOL 12.5 MG/1
12.5 TABLET ORAL 2 TIMES DAILY WITH MEALS
Qty: 180 TABLET | Refills: 3 | Status: SHIPPED | OUTPATIENT
Start: 2023-11-07 | End: 2024-03-26

## 2023-11-07 ASSESSMENT — ENCOUNTER SYMPTOMS
EYE PAIN: 0
FREQUENCY: 0
SORE THROAT: 0
MYALGIAS: 0
NAUSEA: 0
HEMATURIA: 0
BREAST MASS: 0
DYSURIA: 0
HEADACHES: 0
CONSTIPATION: 1
WEAKNESS: 0
HEARTBURN: 1
DIZZINESS: 0
SHORTNESS OF BREATH: 0
PARESTHESIAS: 0
NERVOUS/ANXIOUS: 0
CHILLS: 0
HEMATOCHEZIA: 0
PALPITATIONS: 0
DIARRHEA: 0
ABDOMINAL PAIN: 0
COUGH: 0
FEVER: 0

## 2023-11-07 NOTE — COMMUNITY RESOURCES LIST (ENGLISH)
11/07/2023   [facility] [employer]  N/A  For questions about this resource list or additional care needs, please contact your primary care clinic or care manager.  Phone: [phone]   Email: N/A   Address: [address] [place] [address]   Hours: N/A        Financial Stability       Utility payment assistance  1  [facility] [place] [facility] Distance: 5.31 miles      In-Person, Phone/Virtual   [address] [place] [address]  Language: English  Hours: Mon 4:00 PM - 6:00 PM , Tue 11:00 AM - 1:00 PM , Wed 4:00 PM - 6:00 PM , Thu 10:30 AM - 12:30 PM  Fees: Free   Phone: (632) 522-6181 Email: resources@[employer].org Website: https://[email or web address]/Reader/mission-outpost/     2  [employer] ([facility]) [facility] - Energy Assistance Program (EAP) Distance: 7.71 miles      Phone/Virtual   [address] [place] [address] [place]379  Language: English, Albanian  Hours: Mon - Fri 8:00 AM - 4:30 PM  Fees: Free   Phone: (559) 563-1544 Email: info@[employer].org Website: https://www.capagency.org/          Important Numbers & Websites       Emergency Services   911  [facility]   311  Poison Control   (169) 702-6822  Suicide Prevention Lifeline   (326) 145-3941 (TALK)  Child Abuse Hotline   (207) 858-5766 (4-A-Child)  Sexual Assault Hotline   (179) 449-5171 (HOPE)  National Runaway Safeline   (416) 359-5086 (RUNAWAY)  All-Options Talkline   (630) 896-4251  Substance Abuse Referral   (633) 958-3926 (HELP)

## 2023-11-07 NOTE — PROGRESS NOTES
Assessment & Plan     Jayda was seen today for physical.    Diagnoses and all orders for this visit:    Routine general medical examination at a health care facility    Encounter for screening involving social determinants of health (SDoH)  Would to explore insurance options with better coverage to treat TD2 such as CGM.   -     Primary Care - Care Coordination Referral; Future    Screen for colon cancer  Will look into insurance coverage of FIT, not interested in colonoscopy at this time  -     Fecal colorectal cancer screen (FIT); Future    Diabetic peripheral neuropathy (H)  Stable. Labs pending, will return when fasting. Continue current amitriptyline dose, did not tolerate increased doses  -     HEMOGLOBIN A1C; Future  -     Lipid panel reflex to direct LDL Non-fasting; Future  -     amitriptyline (ELAVIL) 25 MG tablet; Take 1 tablet (25 mg) by mouth at bedtime    Stage 3b chronic kidney disease (H)  Labs pending, will return when fasting.  -     Albumin Random Urine Quantitative with Creat Ratio; Future  -     BASIC METABOLIC PANEL; Future    Visit for screening mammogram  -     MA SCREENING DIGITAL BILAT - Future  (s+30); Future    Type 2 diabetes mellitus with diabetic neuropathy, with long-term current use of insulin (H)  Stable. Labs pending, will return when fasting. Continue current dosage, follow up if BG below 70 overnight for adjustments.   -     CBC with platelets; Future  -     insulin aspart (NOVOLOG PEN) 100 UNIT/ML pen; Inject 8-14 Units Subcutaneous 3 times daily (with meals) SSI  -     insulin glargine (BASAGLAR KWIKPEN) 100 UNIT/ML pen; Inject 24 Units Subcutaneous daily  -     metFORMIN (GLUCOPHAGE) 500 MG tablet; Take 1 tablet (500 mg) by mouth 2 times daily (with meals)    Hypertension goal BP (blood pressure) < 140/90  Well controlled. Labs pending, will return when fasting.  -     amLODIPine (NORVASC) 10 MG tablet; Take 1 tablet (10 mg) by mouth daily  -     carvedilol (COREG) 12.5 MG  "tablet; Take 1 tablet (12.5 mg) by mouth 2 times daily (with meals)  -     Comprehensive metabolic panel (BMP + Alb, Alk Phos, ALT, AST, Total. Bili, TP); Future    Lumbar paraspinal muscle spasm  Occasional use of muscle relaxant,  currently out but would like to have on hand.   -     cyclobenzaprine (FLEXERIL) 5 MG tablet; Take 1 tablet (5 mg) by mouth 3 times daily as needed for muscle spasms    S/P hysterectomy  Stable, controlling migraine symptoms. Previous attempts to discontinue caused worsening migraines.    -     estradiol (ESTRACE) 2 MG tablet; Take 1 tablet (2 mg) by mouth daily    Hyperlipidemia LDL goal <100  Recent Labs   Lab Test 12/07/22  1025 12/14/21  1019   CHOL 202* 203*   HDL 32* 42*   LDL 68 103*   TRIG 581* 451*     Labs pending, will return when fasting.  -     fenofibrate (TRIGLIDE/LOFIBRA) 160 MG tablet; TAKE 1 TABLET (160 MG) BY MOUTH ONCE DAILY WITH A MEAL.    Onychomycosis  Education provided on treatment options include home treatment with tea tree oil and rx course of oral terbinafine. Will check liver function first, then consider terbinafine course. Labs pending.      Other orders  -     Pneumococcal 20 Valent Conjugate (Prevnar 20)  -     REVIEW OF HEALTH MAINTENANCE PROTOCOL ORDERS  -     PRIMARY CARE FOLLOW-UP SCHEDULING; Future     BMI:   Estimated body mass index is 32.13 kg/m  as calculated from the following:    Height as of this encounter: 1.6 m (5' 3\").    Weight as of this encounter: 82.3 kg (181 lb 6.4 oz).     FUTURE LABS:       - Schedule a fasting blood draw when convenient    Dasia Roldan DNP student at the [facility]      I was present with the student who participated in the service and in the documentation of the note, which I have reviewed and verified. The history, reviews of systems, objective data, and assessment/plan were completed by myself.    Jessy García, [facility] [place]          Alvarez Montelongo is a 58 year old, " presenting for the following health issues:  Physical        11/7/2023    12:43 PM   Additional Questions   Roomed by Salma     Jayda is a 58 year old female with T2DM, hyper triglycerides, HTN, stage 3 chronic kidney disease, [place], and diabetic peripheral neuropathy who is hear to address her TD2.     T2DM: BS are improved overall, highest 212, lows in night have improved recent are 71, 84, and 81. Checks at 2am when she is awake to use the restroom. Adjust meal time insulin 8-14u depending on carbohydrates. Basaglar 24 units.      Neuropathy: unchanged.      Healthy Habits:     Getting at least 3 servings of Calcium per day:  Yes    Bi-annual eye exam:  NO    Dental care twice a year:  NO    Sleep apnea or symptoms of sleep apnea:  Excessive snoring    Diet:  Diabetic    Frequency of exercise:  2-3 days/week    Duration of exercise:  15-30 minutes    Taking medications regularly:  Yes    Medication side effects:  Other    Additional concerns today:  Yes         Diabetes Follow-up    How often are you checking your blood sugar? Three times daily  Blood sugar testing frequency justification:    What time of day are you checking your blood sugars (select all that apply)?    Have you had any blood sugars above 200?  Yes   Have you had any blood sugars below 70?  Yes   What symptoms do you notice when your blood sugar is low?  Dizzy  What concerns do you have today about your diabetes? None and Other: neuropathy n feet   Do you have any of these symptoms? (Select all that apply)  Burning in feet  Have you had a diabetic eye exam in the last 12 months? No        BP Readings from Last 2 Encounters:   11/07/23 126/66   07/27/23 136/80     Hemoglobin A1C (%)   Date Value   12/07/2022 6.1 (H)   09/13/2022 5.8 (H)     LDL Cholesterol Calculated (no units)   Date Value   12/07/2022      Comment:     Cannot estimate LDL when triglyceride exceeds 400 mg/dL   12/14/2021      Comment:     Cannot estimate LDL when triglyceride  "exceeds 400 mg/dL     LDL Cholesterol Direct (mg/dL)   Date Value   12/07/2022 68   12/14/2021 103 (H)         Hypertension Follow-up    Do you check your blood pressure regularly outside of the clinic? No   Are you following a low salt diet? Yes  Are your blood pressures ever more than 140 on the top number (systolic) OR more   than 90 on the bottom number (diastolic), for example 140/90? No      Review of Systems   Constitutional:  Negative for chills and fever.   HENT:  Positive for ear pain and hearing loss (Planning to get hearing tested). Negative for congestion and sore throat.    Eyes:  Negative for pain and visual disturbance.   Respiratory:  Negative for cough and shortness of breath.    Cardiovascular:  Negative for chest pain, palpitations and peripheral edema.   Gastrointestinal:  Positive for constipation (chronic) and heartburn (chronic). Negative for abdominal pain, diarrhea, hematochezia and nausea.   Breasts:  Negative for tenderness, breast mass and discharge.   Genitourinary:  Negative for dysuria, frequency, genital sores, hematuria, pelvic pain, urgency, vaginal bleeding and vaginal discharge.   Musculoskeletal:  Negative for myalgias.   Skin:  Negative for rash.   Neurological:  Negative for dizziness, weakness, headaches and paresthesias.   Psychiatric/Behavioral:  Negative for mood changes. The patient is not nervous/anxious.             Objective    /66   Pulse 81   Temp 97.3  F (36.3  C) (Tympanic)   Resp 18   Ht 1.6 m (5' 3\")   Wt 82.3 kg (181 lb 6.4 oz)   SpO2 100%   BMI 32.13 kg/m    Body mass index is 32.13 kg/m .  Physical Exam  Constitutional:       Appearance: Normal appearance.   HENT:      Right Ear: Tympanic membrane and ear canal normal.      Left Ear: Tympanic membrane and ear canal normal.      Nose: Nose normal.      Mouth/Throat:      Mouth: Mucous membranes are moist.      Pharynx: Oropharynx is clear.   Eyes:      Pupils: Pupils are equal, round, and reactive " to light.   Cardiovascular:      Rate and Rhythm: Normal rate and regular rhythm.      Pulses: Normal pulses.      Heart sounds: Normal heart sounds.   Pulmonary:      Effort: Pulmonary effort is normal. No respiratory distress.      Breath sounds: Normal breath sounds. No stridor. No wheezing, rhonchi or rales.   Abdominal:      General: Abdomen is flat. Bowel sounds are normal.      Palpations: Abdomen is soft.   Musculoskeletal:      Cervical back: Normal range of motion and neck supple.        Feet:    Feet:      Right foot:      Skin integrity: No ulcer, blister, skin breakdown, erythema or callus.      Toenail Condition: Right toenails are normal.      Left foot:      Skin integrity: No ulcer, blister, skin breakdown, erythema or callus.      Toenail Condition: Fungal disease present.  Lymphadenopathy:      Cervical: No cervical adenopathy.   Skin:     General: Skin is warm and dry.      Capillary Refill: Capillary refill takes less than 2 seconds.   Neurological:      Mental Status: She is alert.

## 2023-11-09 ENCOUNTER — PATIENT OUTREACH (OUTPATIENT)
Dept: CARE COORDINATION | Facility: CLINIC | Age: 58
End: 2023-11-09
Payer: COMMERCIAL

## 2023-11-09 ENCOUNTER — NURSE TRIAGE (OUTPATIENT)
Dept: NURSING | Facility: CLINIC | Age: 58
End: 2023-11-09
Payer: COMMERCIAL

## 2023-11-09 DIAGNOSIS — I10 HYPERTENSION GOAL BP (BLOOD PRESSURE) < 140/90: ICD-10-CM

## 2023-11-09 RX ORDER — AMLODIPINE BESYLATE 10 MG/1
10 TABLET ORAL DAILY
Qty: 30 TABLET | Refills: 0 | Status: SHIPPED | OUTPATIENT
Start: 2023-11-09 | End: 2023-11-22

## 2023-11-09 NOTE — PROGRESS NOTES
Clinic Care Coordination Contact  [facility]/Voicemail    Clinical Data: Care Coordinator Outreach    Outreach Documentation Number of Outreach Attempt   11/9/2023   8:39 AM 1       Left message on patient's voicemail with call back information and requested return call.    Plan: Care Coordinator will try to reach patient again in 1-2 business days.      Cherrie REYES [facility]  Community Health Worker  [facility] Clinics:  [place], [place] & [place]   Clinic Care Coordination  [phone]

## 2023-11-09 NOTE — PROGRESS NOTES
Patient calls for amlodipine refill.     Rx renewed yesterday and sent to mail order. Patient will be out tomorrow and requesting short supply be sent to local pharmacy.     Rx sent for 30 tablets.     ELENI COON RN on 11/9/2023 at 12:31 PM   [facility]

## 2023-11-09 NOTE — TELEPHONE ENCOUNTER
In yesterday for diabetes check. It's strange Amlodipine was continued. Pharmacy said MD cancelled it. I connected her with the [place] clinic for them to process the request since she will be out of the medication today.  Laly Cuevas RN  [facility]    Reason for Disposition   Prescription refill request for ESSENTIAL medicine (i.e., likelihood of harm to patient if not taken) and triager unable to refill per department policy    Additional Information   Negative: New-onset or worsening symptoms, see that protocol (e.g., diarrhea, runny nose, sore throat)   Negative: Medicine question not related to refill or renewal   Negative: Caller (e.g., patient or pharmacist) requesting information about a new medicine   Negative: Caller requesting information unrelated to medicine    Protocols used: Medication Refill and Renewal Call-A-OH

## 2023-11-10 NOTE — PROGRESS NOTES
Clinic Care Coordination Contact  Community Health Worker Initial Outreach    CHW Initial Information Gathering:  Current living arrangement:: I live in a private home with spouse  Type of residence:: Private home - stairs  Community Resources: None  Supplies Currently Used at Home: Diabetic Supplies  No PCP office visit in Past Year: No  Transportation means:: Regular car       Patient accepts CC: Yes. Patient scheduled for assessment with CC RN on 11/13/2023 at 10:00 AM. Patient noted desire to discuss Care Coordination.     CHW was able to connect with the Patient and introduce self/care coordination and intent of call.   Per Patient, she is paying 700-800 dollars a month because she does not have good health insurance coverage for the insulin. For all of her medications, Patient states she pays 1,200-1,500 dollars a month.   States that her  has health insurance, however it requires a high premium and it does not have good insulin coverage.    Patient also needs hearing aids, a eye check and glasses, and a CPAP machine, however these are all costly. Patient feels like she has to wait until she qualifies for Medicare to obtain any of theses.    Patient interested in food shelf/pantry information.      Cherrie REYES [facility]  Community Health Worker  [facility] Clinics:  [place], [place] & [place]   Clinic Care Coordination  [phone]

## 2023-11-14 ENCOUNTER — PATIENT OUTREACH (OUTPATIENT)
Dept: CARE COORDINATION | Facility: CLINIC | Age: 58
End: 2023-11-14
Payer: COMMERCIAL

## 2023-11-14 NOTE — PROGRESS NOTES
Clinic Care Coordination Contact  [facility]/Voicemail    Clinical Data: Care Coordinator Outreach    Outreach Documentation Number of Outreach Attempt   11/9/2023   8:39 AM 1   11/13/2023  10:01 AM 1   11/14/2023   1:24 PM 3       Left message on patient's voicemail with call back information and requested return call.    Plan: Care Coordinator will try to reach patient again in 3-5 business days.    Jayda Salas RN, BSN, CPHN, CM  [facility] Ambulatory Care Management  [facility]  Phone: [phone]  Email: [email or web address]

## 2023-11-17 ENCOUNTER — PATIENT OUTREACH (OUTPATIENT)
Dept: CARE COORDINATION | Facility: CLINIC | Age: 58
End: 2023-11-17
Payer: COMMERCIAL

## 2023-11-17 NOTE — PROGRESS NOTES
Clinic Care Coordination Contact  [facility]/Voicemail    Clinical Data: Care Coordinator Outreach    Outreach Documentation Number of Outreach Attempt   11/13/2023  10:01 AM 1   11/14/2023   1:24 PM 3   11/14/2023   1:26 PM 2   11/17/2023   1:41 PM 2       Left message on patient's voicemail with call back information and requested return call.    Plan: Care Coordinator will send unable to contact letter with care coordinator contact information via [employer]. Care Coordinator will try to reach patient again in 3-5 business days.    Jayda Salas RN, BSN, CPHN, CM  [facility] Ambulatory Care Management  [facility]  Phone: [phone]  Email: [email or web address]

## 2023-11-17 NOTE — LETTER
[facility] CARE COORDINATION  [address] [facility] [address] [facility] [address]    November 17, 2023    Jayda Berrios  [address]  [place] [address]      Dear Jayda,    I have been attempting to reach you to complete your enrollment in Care Coordination.  I would like to work with you and provide any additional support you may need on achieving your health care related goals. I would appreciate if you would give me a call at [phone] to let me know if you would like to continue working together. I know that there are many things that can affect our ability to communicate and I hope we can continue to work together.    All of us at the [facility] are invested in your health and are here to assist you in meeting your goals.     Sincerely,    Jayda Salas RN, BSN, CPHN, CM  [facility] Ambulatory Care Management  [facility]  Phone: [phone]  Email: [email or web address]

## 2023-11-21 ENCOUNTER — LAB (OUTPATIENT)
Dept: LAB | Facility: CLINIC | Age: 58
End: 2023-11-21
Payer: COMMERCIAL

## 2023-11-21 ENCOUNTER — PATIENT OUTREACH (OUTPATIENT)
Dept: CARE COORDINATION | Facility: CLINIC | Age: 58
End: 2023-11-21

## 2023-11-21 DIAGNOSIS — E11.40 TYPE 2 DIABETES MELLITUS WITH DIABETIC NEUROPATHY, WITH LONG-TERM CURRENT USE OF INSULIN (H): ICD-10-CM

## 2023-11-21 DIAGNOSIS — Z12.11 SCREEN FOR COLON CANCER: ICD-10-CM

## 2023-11-21 DIAGNOSIS — E11.42 DIABETIC PERIPHERAL NEUROPATHY (H): ICD-10-CM

## 2023-11-21 DIAGNOSIS — N18.32 STAGE 3B CHRONIC KIDNEY DISEASE (H): Primary | ICD-10-CM

## 2023-11-21 DIAGNOSIS — Z79.4 TYPE 2 DIABETES MELLITUS WITH DIABETIC NEUROPATHY, WITH LONG-TERM CURRENT USE OF INSULIN (H): ICD-10-CM

## 2023-11-21 DIAGNOSIS — I10 HYPERTENSION GOAL BP (BLOOD PRESSURE) < 140/90: ICD-10-CM

## 2023-11-21 LAB
ALBUMIN SERPL BCG-MCNC: 4 G/DL (ref 3.5–5.2)
ALP SERPL-CCNC: 58 U/L (ref 40–150)
ALT SERPL W P-5'-P-CCNC: 22 U/L (ref 0–50)
ANION GAP SERPL CALCULATED.3IONS-SCNC: 13 MMOL/L (ref 7–15)
AST SERPL W P-5'-P-CCNC: 26 U/L (ref 0–45)
BILIRUB SERPL-MCNC: 0.2 MG/DL
BUN SERPL-MCNC: 37.1 MG/DL (ref 8–23)
CALCIUM SERPL-MCNC: 9.1 MG/DL (ref 8.8–10.2)
CHLORIDE SERPL-SCNC: 103 MMOL/L (ref 98–107)
CHOLEST SERPL-MCNC: 194 MG/DL
CREAT SERPL-MCNC: 1.86 MG/DL (ref 0.51–0.95)
CREAT UR-MCNC: 45.1 MG/DL
DEPRECATED HCO3 PLAS-SCNC: 25 MMOL/L (ref 22–29)
EGFRCR SERPLBLD CKD-EPI 2021: 30 ML/MIN/1.73M2
ERYTHROCYTE [DISTWIDTH] IN BLOOD BY AUTOMATED COUNT: 12.5 % (ref 10–15)
GLUCOSE SERPL-MCNC: 126 MG/DL (ref 70–99)
HBA1C MFR BLD: 6 % (ref 0–5.6)
HCT VFR BLD AUTO: 36.5 % (ref 35–47)
HDLC SERPL-MCNC: 32 MG/DL
HGB BLD-MCNC: 11.7 G/DL (ref 11.7–15.7)
LDLC SERPL CALC-MCNC: ABNORMAL MG/DL
LDLC SERPL DIRECT ASSAY-MCNC: 63 MG/DL
MCH RBC QN AUTO: 29.6 PG (ref 26.5–33)
MCHC RBC AUTO-ENTMCNC: 32.1 G/DL (ref 31.5–36.5)
MCV RBC AUTO: 92 FL (ref 78–100)
MICROALBUMIN UR-MCNC: 31.7 MG/L
MICROALBUMIN/CREAT UR: 70.29 MG/G CR (ref 0–25)
NONHDLC SERPL-MCNC: 162 MG/DL
PLATELET # BLD AUTO: 209 10E3/UL (ref 150–450)
POTASSIUM SERPL-SCNC: 5.6 MMOL/L (ref 3.4–5.3)
PROT SERPL-MCNC: 6.7 G/DL (ref 6.4–8.3)
RBC # BLD AUTO: 3.95 10E6/UL (ref 3.8–5.2)
SODIUM SERPL-SCNC: 141 MMOL/L (ref 135–145)
TRIGL SERPL-MCNC: 620 MG/DL
WBC # BLD AUTO: 6.3 10E3/UL (ref 4–11)

## 2023-11-21 PROCEDURE — 80053 COMPREHEN METABOLIC PANEL: CPT

## 2023-11-21 PROCEDURE — 85027 COMPLETE CBC AUTOMATED: CPT

## 2023-11-21 PROCEDURE — 36415 COLL VENOUS BLD VENIPUNCTURE: CPT

## 2023-11-21 PROCEDURE — 80061 LIPID PANEL: CPT

## 2023-11-21 PROCEDURE — 82043 UR ALBUMIN QUANTITATIVE: CPT

## 2023-11-21 PROCEDURE — 82570 ASSAY OF URINE CREATININE: CPT

## 2023-11-21 PROCEDURE — 83721 ASSAY OF BLOOD LIPOPROTEIN: CPT

## 2023-11-21 PROCEDURE — 83036 HEMOGLOBIN GLYCOSYLATED A1C: CPT

## 2023-11-21 ASSESSMENT — ACTIVITIES OF DAILY LIVING (ADL): DEPENDENT_IADLS:: INDEPENDENT

## 2023-11-21 NOTE — LETTER
Please review the following resources to assist with medication affordability:    [employer] Prescription Assistance Program: #625-711-5424   GoodRX: https://www.Grouper.com/   https://www.Hashgo.org/prescription-assistance/rx-outreach.html   https://Best Teacher.MiTurno/   My Good Days: #4-805-140-4737 https://www.mygooddays.org/patients/diseases-covered   Medicare Assistance Programs: https://www.medicare.gov/pharmaceutical-assistance-program/Index.aspx   [employer]: #3-460-285-0265 https://www.InteliCloud.MiTurno/eligibility/   [facility]: #3-392-767-8189 https://www.Stitcher.MiTurno/   [place] Insulin Safety Net   https://www.needymeds.org/   [place]: #583.180.5723    Prescription Prices, Coupons & Pharmacy Information - GoodRx    Compare prescription drug prices and find coupons at more than 70,000 [place] pharmacies. Save up to 80% instantly!    [place] Insulin Safety Net Program / [place] Board of Pharmacy ([place].gov)    [place] Insulin Safety [place] Program / [place] Board of Pharmacy    Glasses Resources     Low Cost Vision Providers    [facility] - #[phone]   https://www.Path101/eye-center/[place]/   [address] [place] [address]     Our [facility] facility is now open and home to both adult and pediatric eye care.           [facility] - #[phone]   http://www.[facility].org/vision   [facility] [address] [place] [address]     Sliding fee scale optometry services based on family size and income. Accepts private insurance and Medical Assistance.      [facility] - #[phone]   https://[facility] [employer] [facility]/locations/[address] [address]      [facility] - #[phone]   http://www.Propers/pages/Home/   [address] [place] [address]     Offers reduced exam rate $99 and discounted prices  for eyeglasses and lenses for uninsured patients.         [facility] - #[phone]   http://[facility].[email or web address]/   [address] [place] [address]     Accepts all patients, insured or uninsured. Payment is based on family size and household income. Services include medical, dental and behavioral health and chiropractic and eye care. Assistance available with applying for Medical Assistance and [facility].

## 2023-11-21 NOTE — PROGRESS NOTES
Clinic Care Coordination Contact  Clinic Care Coordination Contact  OUTREACH    Referral Information: RN CC received call from patient to complete assessment for enrollment in Care Coordination. She had left her phone and laptop [place] a friend[place] of [facility]. Took a while for someone to retrieve them for her.   Referral Source: Care Team     Chief Complaint   Patient presents with    Clinic Care Coordination - Follow-up      Universal Utilization: Risk of admission or ED visit 48.7%  Clinic Utilization  Difficulty keeping appointments:: No  Compliance Concerns: No  No-Show Concerns: No  No PCP office visit in Past Year: No  Utilization      No Show Count (past year)  1             ED Visits  0             Hospital Admissions  0                    Current as of: 11/21/2023  8:43 AM              Clinical Concerns:  Current Medical Concerns:  Worsening neuropathy in her feet. Radiates to toes & ankles.     Current Behavioral Concerns: None.     Education Provided to patient: Educated patient on Care Coordination: outreach schedule; resources available; shared responsibility of goals & goals to achieve.    Pain  Pain (GOAL):: Yes  Type: Chronic (>3mo) (Diabetic neuropathy)  Location of chronic pain:: Feet  Radiating: Yes  Location pain radiates to: ankles, toes  Progression: Worsening  Description of pain: Burning, Stabbing  Chronic pain severity:: 9  Limitation of routine activities due to chronic pain:: Yes  Alleviating Factors:  (Nothing)  Aggravating Factors: Activity  Health Maintenance Reviewed: Due/Overdue  (Discussed with patient.)  Clinical Pathway: None    Medication Management:  Medication review status: Medications recently reviewed. Did not re-review.   Current Outpatient Medications   Medication    amitriptyline (ELAVIL) 25 MG tablet    amLODIPine (NORVASC) 10 MG tablet    amLODIPine (NORVASC) 10 MG tablet    aspirin (ASA) 81 MG EC tablet    blood glucose (ACCU-CHEK SMARTVIEW) test strip     blood glucose ([employer] BLOOD GLUCOSE TEST) test strip    blood glucose (NO BRAND SPECIFIED) test strip    blood glucose calibration (NO BRAND SPECIFIED) solution    blood glucose monitoring (NO BRAND SPECIFIED) meter device kit    carvedilol (COREG) 12.5 MG tablet    cyclobenzaprine (FLEXERIL) 5 MG tablet    estradiol (ESTRACE) 2 MG tablet    fenofibrate (TRIGLIDE/LOFIBRA) 160 MG tablet    hydrochlorothiazide (HYDRODIURIL) 12.5 MG tablet    insulin aspart (NOVOLOG PEN) 100 UNIT/ML pen    insulin glargine (BASAGLAR KWIKPEN) 100 UNIT/ML pen    insulin pen needle (32G X 4 MM) 32G X 4 MM miscellaneous    metFORMIN (GLUCOPHAGE) 500 MG tablet    omeprazole (PRILOSEC) 40 MG DR capsule    ondansetron (ZOFRAN) 4 MG tablet    STATIN NOT PRESCRIBED (INTENTIONAL)    SUMAtriptan (IMITREX) 50 MG tablet    thin (NO BRAND SPECIFIED) lancets     Current Facility-Administered Medications   Medication    lidocaine 1 % injection 2 mL    triamcinolone (KENALOG-40) injection 40 mg      Allergies   Allergen Reactions    Iodine Anaphylaxis    Peanut Oil Anaphylaxis    Statins [Statins] Muscle Pain (Myalgia)     Patient reports that she tried all statins and was not able to tolerate any of them due to severe muscle pains.     Ibuprofen Hives    Lisinopril Cough and Other (See Comments)    Codeine Rash     Functional Status:  Dependent ADLs:: Independent  Dependent IADLs:: Independent  Bed or wheelchair confined:: No  Mobility Status: Independent  Fallen 2 or more times in the past year?: Yes  Any fall with injury in the past year?: No    Living Situation:  Current living arrangement:: I live in a private home with spouse  Type of residence:: Private home - stairs    Lifestyle & Psychosocial Needs:    Social Determinants of Health     Food Insecurity: Low Risk  (10/31/2023)    Food Insecurity     Within the past 12 months, did you worry that your food would run out before you got money to buy more?: No     Within the past 12 months, did  the food you bought just not last and you didn t have money to get more?: No   Depression: Not at risk (5/23/2023)    PHQ-2     PHQ-2 Score: 0   Housing Stability: Low Risk  (10/31/2023)    Housing Stability     Do you have housing? : Yes     Are you worried about losing your housing?: No   Tobacco Use: Low Risk  (11/7/2023)    Patient History     Smoking Tobacco Use: Never     Smokeless Tobacco Use: Never     Passive Exposure: Not on file   Financial Resource Strain: High Risk (10/31/2023)    Financial Resource Strain     Within the past 12 months, have you or your family members you live with been unable to get utilities (heat, electricity) when it was really needed?: Yes   Alcohol Use: Not on file   Transportation Needs: Low Risk  (10/31/2023)    Transportation Needs     Within the past 12 months, has lack of transportation kept you from medical appointments, getting your medicines, non-medical meetings or appointments, work, or from getting things that you need?: No   Physical Activity: Not on file   Interpersonal Safety: Low Risk  (11/7/2023)    Interpersonal Safety     Do you feel physically and emotionally safe where you currently live?: Yes     Within the past 12 months, have you been hit, slapped, kicked or otherwise physically hurt by someone?: No     Within the past 12 months, have you been humiliated or emotionally abused in other ways by your partner or ex-partner?: No   Stress: Not on file   Social Connections: Not on file     Diet:: Diabetic diet  Inadequate nutrition (GOAL):: No  Tube Feeding: No  Inadequate activity/exercise (GOAL):: No  Significant changes in sleep pattern (GOAL): No  Transportation means:: Regular car     Anabaptist or spiritual beliefs that impact treatment:: No  Mental health DX:: No  Mental health management concern (GOAL):: No  Chemical Dependency Status: No Current Concerns  Chemical Dependency Management:  (N/A)  Informal Support system:: Spouse, Children     Resources and  Interventions:  Current Resources:      Community Resources: None  Supplies Currently Used at Home: Diabetic Supplies  Equipment Currently Used at Home: glucometer  Employment Status: unemployed     Advance Care Plan/Directive  Advanced Care Plans/Directives on file:: No  Discussed with patient/caregiver:: Referral to [facility]    Referrals Placed: Honoring Choices    Care Plan:  Care Plan: Financial Wellbeing       Problem: Patient expresses financial resource strain       Goal: Create an action plan to increase financial stability       Start Date: 11/21/2023 Expected End Date: 4/23/2024    This Visit's Progress: 0%    Note:     Barriers: Finances; Functional status (neuropathy); Provider availability - wait time to complete appointments.   Strengths: Motivated; Agreeable to Care Coordination.  Patient expressed understanding of goal: Yes.   Action steps to achieve this goal:  1. I will work with the Financial Resource Worker to see if I qualify for any county assistance.   2. I will review resources provided by Care Coordinator and follow up as needed/appropriate.   3. I will contact my care team with questions, concerns or support needs. I will use the clinic as a resource and I understand I can contact my clinic with 24/7 after hours services available. Care Coordinator will remain available as needed.                               Care Plan: Chronic Pain       Problem: Chronic Pain is Not Self-Managed       Goal: Demonstrate improved self-management of chronic pain       Start Date: 11/21/2023 Expected End Date: 3/19/2024    This Visit's Progress: 10%    Note:     Barriers: Finances; Functional status (neuropathy); Provider availability - wait time to complete appointments.   Strengths: Motivated; Agreeable to Care Coordination.  Patient expressed understanding of goal: Yes.   Action steps to achieve this goal:  1. I will discuss and follow up with my Primary Care Provider for improved pain control.    2. I will follow the recommendations of my Primary Care Provider.   3. I will contact my care team with questions, concerns or support needs. I will use the clinic as a resource and I understand I can contact my clinic with 24/7 after hours services available. Care Coordinator will remain available as needed.                               Patient/Caregiver understanding: Patient/caregiver verbalized understanding and denies any additional questions or concerns at this time. RNCC engaged in [employer]ID[employer] communications during encounter.     Outreach Frequency: monthly, more frequently as needed    Plan: RN CC will send patient introduction letter with contact information, resources for Honoring Choices, prescription assistance, Insulin programs, low cost/free dental clinics, glasses & hearing aids via the [facility]. RN CC ensured patient had contact information for any needs prior to receiving introduction letter.     Jayda Salas RN, BSN, CPHN, CM  [facility] Ambulatory Care Management  [facility]  Phone: [phone]  Email: [email or web address]

## 2023-11-21 NOTE — LETTER
[facility] CARE COORDINATION  [address] [address]    November 21, 2023    Jayda Berrios  [address]  [place] [facility] [address]      Dear Jayda,    I am a clinic care coordinator who works with KEM Adams CNP with the [facility]. I wanted to introduce myself and provide you with my contact information for you to be able to call me with any questions or concerns. I wanted to thank you for spending the time to talk with me.  Below is a description of clinic care coordination and how I can further assist you.       The clinic care coordination team is made up of a registered nurse, , financial resource worker and community health worker who understand the health care system. The goal of clinic care coordination is to help you manage your health and improve access to the health care system. Our team works alongside your provider to assist you in determining your health and social needs. We can help you obtain health care and community resources, providing you with necessary information and education. We can work with you through any barriers and develop a care plan that helps coordinate and strengthen the communication between you and your care team.  Our services are voluntary and are offered without charge to you personally.    Please feel free to contact me with any questions or concerns regarding care coordination and what we can offer.      We are focused on providing you with the highest-quality healthcare experience possible.    Sincerely,     Jayda Salas RN, BSN, CPHN, CM  [facility] Ambulatory Care Management  [facility]  Phone: [phone]  Email: [email or web address]    [facility]herrie REYES Public [facility]  Community Health Worker  [facility]  Clinic Care Coordination  [phone]     Enclosed: I have enclosed a copy of the Patient Centered Plan of  Care. This has helpful information and goals that we have talked about. Please keep this in an easy to access place to use as needed. Also included are resources for [facility], Prescription assistance, Insulin programs, low cost dental care, glasses, hearing aids.     [facility] provides support to [facility] [place], [facility], and [facility] locations for ensuring system Advance Care Planning (ACP) and Surrogate Decision-Maker processes comply with Federal and State regulatory requirements and align with system goals and focus areas.    HOURS: Monday-Friday 6a to 5p (with exception of 8 department holidays as indicated on dept email/voicemail)  AFTER HOURS: For emergency validation of documents contact your assigned  per site protocol    CONTACT:    EMAIL: [email or web address]   PHONE: [phone]  EPIC: [facility] [place]    Office: [address] [place] [address] (We are mostly remote with varying on-site hours)    [email or web address]

## 2023-11-22 ENCOUNTER — TELEPHONE (OUTPATIENT)
Dept: FAMILY MEDICINE | Facility: CLINIC | Age: 58
End: 2023-11-22
Payer: COMMERCIAL

## 2023-11-22 ENCOUNTER — OFFICE VISIT (OUTPATIENT)
Dept: PEDIATRICS | Facility: CLINIC | Age: 58
End: 2023-11-22
Payer: COMMERCIAL

## 2023-11-22 VITALS
TEMPERATURE: 97.5 F | BODY MASS INDEX: 32.06 KG/M2 | HEART RATE: 78 BPM | DIASTOLIC BLOOD PRESSURE: 80 MMHG | WEIGHT: 181 LBS | OXYGEN SATURATION: 99 % | SYSTOLIC BLOOD PRESSURE: 128 MMHG

## 2023-11-22 DIAGNOSIS — E87.5 HYPERKALEMIA: Primary | ICD-10-CM

## 2023-11-22 DIAGNOSIS — E87.5 HYPERKALEMIA: ICD-10-CM

## 2023-11-22 LAB
ANION GAP SERPL CALCULATED.3IONS-SCNC: 12 MMOL/L (ref 7–15)
BUN SERPL-MCNC: 31.7 MG/DL (ref 8–23)
CALCIUM SERPL-MCNC: 8.6 MG/DL (ref 8.8–10.2)
CHLORIDE SERPL-SCNC: 100 MMOL/L (ref 98–107)
CREAT SERPL-MCNC: 1.83 MG/DL (ref 0.51–0.95)
DEPRECATED HCO3 PLAS-SCNC: 24 MMOL/L (ref 22–29)
EGFRCR SERPLBLD CKD-EPI 2021: 31 ML/MIN/1.73M2
GLUCOSE SERPL-MCNC: 170 MG/DL (ref 70–99)
POTASSIUM SERPL-SCNC: 4.5 MMOL/L (ref 3.4–5.3)
SODIUM SERPL-SCNC: 136 MMOL/L (ref 135–145)

## 2023-11-22 PROCEDURE — 36415 COLL VENOUS BLD VENIPUNCTURE: CPT | Performed by: INTERNAL MEDICINE

## 2023-11-22 PROCEDURE — 80048 BASIC METABOLIC PNL TOTAL CA: CPT | Performed by: INTERNAL MEDICINE

## 2023-11-22 PROCEDURE — 99207 REFERRAL TO ACUTE AND DIAGNOSTIC SERVICES: CPT | Performed by: NURSE PRACTITIONER

## 2023-11-22 PROCEDURE — 99214 OFFICE O/P EST MOD 30 MIN: CPT | Performed by: INTERNAL MEDICINE

## 2023-11-22 NOTE — TELEPHONE ENCOUNTER
Called patient. Patient is open to recheck and [facility] evaluation.    Patient with history of hyperkalemia, unknown etiology.   Previously required Sorbitol and potassium recheck.     Called ADS and spoke with Dr. Corcoran, they are willing to take patient for recheck of labs.      - Guillermo, CNP

## 2023-11-22 NOTE — PROGRESS NOTES
"  ASSESSMENT:      1.  Hyperkalemia, resolved without intervention.   It is unclear why patient's potassium was elevated yesterday and normal today.   She follows a generally low potassium diet and does not use any NSAIDs except aspirin.   - consider related to coreg - doubt   - consider related to aspirin - doubt  2.  HTN, controlled   3.  CKD3b - contributing to above, stable.   - continue both metformin (okayed by nephrology despite CKD) and hydrochlorothiazide     PLAN:  1.  Decrease baby aspirin to one every 3 days.  2.  Continue other present medications   3.  For neuropathy  - consider trial of different tricyclic medication to lessen constipation risk  - consider medical marijuana, as already contemplated  - consider retrial of gabapentin all at bedtime     Subjective   Jayda is a 58 year old, presenting for the following health issues:  No chief complaint on file.      HPI     Concern - K+ 5.6  Onset: Had labs drawn yesterday, denies any symptoms of hyperkalemia, but does report feeling anxious because she has previously been hospitalized with elevated K+  Description: Does have Stage 3b CKD, T2 DM, HTN  Intensity: mild  Progression of Symptoms:  N/A  Accompanying Signs & Symptoms: N/A  Previous history of similar problem: Yes \"many times\"- was hospitalized   Precipitating factors:        Worsened by: N/A  Alleviating factors:        Improved by: N/A  Therapies tried and outcome: None    Patient with history of DM2, CKD3b, HTN and NSTEMI with normal coronaries.  Found to have hyperkalemia at time of NSTEMI 5/2021.   Hydrochlorothiazide and ARB stopped.  Subsequently put back on hydrochlorothiazide 12.5 mg daily by nephrology, to follow low potassium diet.  Has had occasional episodes of hyperkalemia, for which she got sorbitrol liquid from the [facility] pharmacy.    No recent medication changes past year.   Never misses medications.   No change in diet, follows low potassium diet.   No recent " "illnesses, etc    Review of Systems   Constipation from elavil  -used gabapentin with good results in past, but got \"loopy\" during the day with daytime dosing  - failed lyrica, cymbalta        Objective    /80 (Patient Position: Sitting)   Pulse 78   Temp 97.5  F (36.4  C) (Oral)   Wt 82.1 kg (181 lb)   SpO2 99%   BMI 32.06 kg/m    There is no height or weight on file to calculate BMI.  Physical Exam       Results for orders placed or performed in visit on 11/22/23 (from the past 24 hour(s))   Basic metabolic panel   Result Value Ref Range    Sodium 136 135 - 145 mmol/L    Potassium 4.5 3.4 - 5.3 mmol/L    Chloride 100 98 - 107 mmol/L    Carbon Dioxide (CO2) 24 22 - 29 mmol/L    Anion Gap 12 7 - 15 mmol/L    Urea Nitrogen 31.7 (H) 8.0 - 23.0 mg/dL    Creatinine 1.83 (H) 0.51 - 0.95 mg/dL    GFR Estimate 31 (L) >60 mL/min/1.73m2    Calcium 8.6 (L) 8.8 - 10.2 mg/dL    Glucose 170 (H) 70 - 99 mg/dL                     "

## 2023-11-22 NOTE — PATIENT INSTRUCTIONS
PLAN:  1.  Decrease baby aspirin to one every 3 days.  2.  Continue other present medications   3.  For neuropathy  - consider trial of different tricyclic medication to lessen constipation risk  - consider medical marijuana, as already contemplated  - consider retrial of gabapentin all at bedtime

## 2023-11-22 NOTE — TELEPHONE ENCOUNTER
Patient calls, got lab results in My Chart, Potassium is 5.6. Patient states that in the past she has been hospitalized with this level prior to coming to [place]. Patient states she was given Sorbitol and is wondering if this is indicated now?   As it is Thanksgiving weekend, patient would prefer not to have to go to the hospital. Is wanting advice as to what to do.  Lab value checked, it is 5.6, Dr. García has not commented as of yet.  Will forward to Dr. García and triage.

## 2023-11-24 ENCOUNTER — PATIENT OUTREACH (OUTPATIENT)
Dept: CARE COORDINATION | Facility: CLINIC | Age: 58
End: 2023-11-24
Payer: COMMERCIAL

## 2023-11-24 NOTE — TELEPHONE ENCOUNTER
Clinic Care Coordination Contact  Program: [facility], County Benefit  County: [facility] Case #:  [facility] Worker:   [place] #:   Subscriber #:   Renewal:  Date Applied:     FRW Outreach:   11/24/23- Outreach attempted x 1. Left message on voicemail with call back information and requested return call.  Plan: FRW will call again within one week.   Thelma Navarro  Financial Resource Worker  [facility]  Clinic Care Coordination  [phone]         Health Insurance:      Referral/Screening:  FRW Screening    Row Name 11/21/23 1036       [facility] Benefits   Is patient requesting help applying for [facility] benefits? Yes       Have you recently applied for any [facility] benefits? No       How many people in your household? 2       Do you buy/eat food together? Yes       Insurance:   [place]? No       Is this an insurance renewal? No       Is this a new insurance application request? No       OTHER   Is this a chris care application? Yes       Any other information for the FRW? Patient is unemployed, needs Diabetic supplies, dental care, new glasses, hearing aids.

## 2023-11-29 NOTE — RESULT ENCOUNTER NOTE
Reviewed results via telephone.      KEM Adams, CNP  [facility]

## 2023-11-30 NOTE — COMMUNITY RESOURCES LIST (ENGLISH)
11/30/2023   [facility]  N/A  For questions about this resource list or additional care needs, please contact your primary care clinic or care manager.  Phone: [phone]   Email: N/A   Address: [address] [place] [address]   Hours: N/A        Financial Stability       Utility payment assistance  1  [facility] [employer] [facility] [place] [facility] Distance: 5.31 miles      In-Person, Phone/Virtual   [address] [place] [address]  Language: English  Hours: Mon 4:00 PM - 6:00 PM , Tue 11:00 AM - 1:00 PM , Wed 4:00 PM - 6:00 PM , Thu 10:30 AM - 12:30 PM  Fees: Free   Phone: (740) 801-6176 Email: resources@[employer].org Website: https://[facility].org/[place]/mission-outpost/     2  [employer] ([facility]) [facility] - Energy Assistance Program (EAP) Distance: 7.71 miles      Phone/Virtual   [address] [place] [address] [place] [facility]  Language: English, Ivorian  Hours: Mon - Fri 8:00 AM - 4:30 PM  Fees: Free   Phone: (163) 297-9563 Email: info@[employer].org Website: https://www.capagency.org/          Important Numbers & Websites       Emergency Services   911  [facility]   311  Poison Control   (411) 182-5872  Suicide Prevention Lifeline   (967) 201-4338 (TALK)  Child Abuse Hotline   (311) 172-8981 (4-A-Child)  Sexual Assault Hotline   (774) 568-6173 (HOPE)  National Runaway Safeline   (270) 298-1839 (RUNAWAY)  All-Options Talkline   (410) 741-3587  Substance Abuse Referral   (709) 572-2340 (HELP)

## 2023-12-01 ENCOUNTER — PATIENT OUTREACH (OUTPATIENT)
Dept: CARE COORDINATION | Facility: CLINIC | Age: 58
End: 2023-12-01
Payer: COMMERCIAL

## 2023-12-01 NOTE — TELEPHONE ENCOUNTER
Clinic Care Coordination Contact  Program: [facility], [place] Benefit  County: [facility] Case #:  [facility] Worker:   [place] #:   Subscriber #:   Renewal:  Date Applied:     FRW Outreach:   12/1/23- Outreach attempted x 2. Left message on voicemail indicating last outreach attempt.   Plan: FRW closed the FRW program and remove patient from panel. Patient can be referred back to FRW if needed.      Thelma Navarro  Financial Resource Worker  [facility]  Clinic Care Coordination  [phone]     11/24/23- Outreach attempted x 1. Left message on voicemail with call back information and requested return call.  Plan: FRW will call again within one week.   Thelma Navarro  Financial Resource Worker  [facility] Care Coordination  [phone]         Health Insurance:      Referral/Screening:  W Screening    Row Name 11/21/23 1036       County Benefits   Is patient requesting help applying for [facility] benefits? Yes       Have you recently applied for any [facility] benefits? No       How many people in your household? 2       Do you buy/eat food together? Yes       Insurance:   [place]? No       Is this an insurance renewal? No       Is this a new insurance application request? No       OTHER   Is this a chris care application? Yes       Any other information for the FRW? Patient is unemployed, needs Diabetic supplies, dental care, new glasses, hearing aids.

## 2023-12-04 ENCOUNTER — PATIENT OUTREACH (OUTPATIENT)
Dept: CARE COORDINATION | Facility: CLINIC | Age: 58
End: 2023-12-04
Payer: COMMERCIAL

## 2023-12-04 NOTE — PROGRESS NOTES
Clinic Care Coordination Contact    Situation: Patient chart reviewed by care coordinator.    Background: Patient is enrolled in Care Coordination and followed by CHW and RN CC.     Assessment: RN CC received message from FRW that they have been unable to reach patient.     Plan/Recommendations: RN CC will continue to follow patient for any additional needs.    Jayda Salas RN, BSN, CPHN, [facility] Ambulatory Care Management  [facility]  Phone: [phone]  Email: [email or web address]

## 2023-12-06 ENCOUNTER — VIRTUAL VISIT (OUTPATIENT)
Dept: FAMILY MEDICINE | Facility: CLINIC | Age: 58
End: 2023-12-06
Payer: COMMERCIAL

## 2023-12-06 DIAGNOSIS — G89.4 CHRONIC PAIN SYNDROME: ICD-10-CM

## 2023-12-06 DIAGNOSIS — E11.42 DIABETIC PERIPHERAL NEUROPATHY (H): Primary | ICD-10-CM

## 2023-12-06 PROCEDURE — 99213 OFFICE O/P EST LOW 20 MIN: CPT | Mod: VID | Performed by: NURSE PRACTITIONER

## 2023-12-06 RX ORDER — NORTRIPTYLINE HCL 25 MG
25 CAPSULE ORAL AT BEDTIME
Qty: 90 CAPSULE | Refills: 3 | Status: SHIPPED | OUTPATIENT
Start: 2023-12-06 | End: 2024-03-26

## 2023-12-06 NOTE — PROGRESS NOTES
"Jayda is a 58 year old who is being evaluated via a billable video visit.      How would you like to obtain your AVS? MyChart  If the video visit is dropped, the invitation should be resent by: Text to cell phone: [phone]  Will anyone else be joining your video visit? No      Assessment & Plan     Jayda was seen today for diabetes and neuropathy.    Diagnoses and all orders for this visit:    Diabetic peripheral neuropathy (H)  Chronic pain syndrome  20 year history of peripheral neuropathy with previous trials of Gabapentin and Lyrica (associated adverse effects without adequate relief).  Will plan trial of switch from Amitriptyline to Nortriptyline (to lessen anti-cholinergic effects).    Medical cannabis certification completed at today's visit for additional pain relief support.    -     nortriptyline (PAMELOR) 25 MG capsule; Take 1 capsule (25 mg) by mouth at bedtime       BMI:   Estimated body mass index is 32.06 kg/m  as calculated from the following:    Height as of 11/7/23: 1.6 m (5' 3\").    Weight as of 11/22/23: 82.1 kg (181 lb).     Return in about 6 months (around 6/6/2024) for Diabetes Follow-up, In Clinic.    Jessy García, KEM [facility]   Jayda is a 58 year old, presenting for the following health issues:  Diabetes and NEUROPATHY        12/6/2023     2:19 PM   Additional Questions   Roomed by Buffy PARR       History of Present Illness       Diabetes:   She presents for follow up of diabetes.  She is checking home blood glucose two times daily.   She checks blood glucose after meals and at bedtime.  Blood glucose is sometimes over 200 and sometimes under 70. She is aware of hypoglycemia symptoms including shakiness, dizziness, weakness and confusion.   She is concerned about other.   She is having numbness in feet and burning in feet.  The patient has not had a diabetic eye exam in the last 12 months.          She eats 2-3 servings of fruits and " vegetables daily.She consumes 2 sweetened beverage(s) daily.She exercises with enough effort to increase her heart rate 10 to 19 minutes per day.  She exercises with enough effort to increase her heart rate 3 or less days per week.   She is taking medications regularly.    Patient reports 20 year history of neuropathy (numbness and tingling) in bilateral LE's.    History of tarsal tunnel surgery as a teenager.  Was told the neuropathy wasn't related to the history of   Saw a neuropathy specialist 15 years ago, had testing completed and neuropathy was confirmed.   Gabapentin was started at that time, took for many years.  Couldn't function with taking Gabapentin higher doses of Gabapentin.    Then started Lyrica without much relief (took for 1 year) and then switched to amitriptyline (which has been the best).    Associated sun sensitivity with Amitriptyline - would stop during the summer months.    Hasn't had a full time job over the past few years and has been able to manage but still isn't able to attend events, do extra things.    Last year tried laser treatment and tens units - minimal relief.   Difficulty with evenings and sleeping at night.   Shooting pains wake her up at night, has to get out of bed.      Currently on amitriptyline 25 mg daily (previously didn't tolerate increase in dose).           12/6/2023     2:40 PM   PEG Score   PEG Total Score 9         Review of Systems   Constitutional, HEENT, cardiovascular, pulmonary, gi and gu systems are negative, except as otherwise noted.      Objective           Vitals:  No vitals were obtained today due to virtual visit.    Physical Exam   GENERAL: Healthy, alert and no distress  PSYCH: Mentation appears normal, affect normal/bright, judgement and insight intact, normal speech and appearance well-groomed.          Video-Visit Details    Type of service:  Video Visit     Originating Location (pt. Location): Home  Distant Location (provider location):   On-site  Platform used for Video Visit: Demetrius

## 2023-12-19 ENCOUNTER — PATIENT OUTREACH (OUTPATIENT)
Dept: CARE COORDINATION | Facility: CLINIC | Age: 58
End: 2023-12-19
Payer: COMMERCIAL

## 2023-12-19 NOTE — PROGRESS NOTES
Clinic Care Coordination Contact  Community Health Worker Follow Up    Care Gaps:     Health Maintenance Due   Topic Date Due    ADVANCE CARE PLANNING  Never done    ZOSTER IMMUNIZATION (1 of 2) Never done    COLORECTAL CANCER SCREENING  12/02/2021    DIABETIC FOOT EXAM  12/14/2022    EYE EXAM  03/01/2023    RSV VACCINE (Pregnancy & 60+) (1 - 1-dose 60+ series) Never done    INFLUENZA VACCINE (1) Never done    COVID-19 Vaccine (3 - 2023-24 season) 09/01/2023    MAMMO SCREENING  11/24/2023       Patient focused on addressing her current goals.     Care Plan:   Care Plan: Financial Wellbeing       Problem: Patient expresses financial resource strain       Goal: Create an action plan to increase financial stability       Start Date: 11/21/2023 Expected End Date: 4/23/2024    This Visit's Progress: 0% Recent Progress: 0%    Note:     Barriers: Finances; Functional status (neuropathy); Provider availability - wait time to complete appointments.   Strengths: Motivated; Agreeable to Care Coordination.  Patient expressed understanding of goal: Yes.   Action steps to achieve this goal:  1. I will work with the Financial Resource Worker to see if I qualify for any county assistance.   2. I will review resources provided by Care Coordinator and follow up as needed/appropriate.   3. I will contact my care team with questions, concerns or support needs. I will use the clinic as a resource and I understand I can contact my clinic with 24/7 after hours services available. Care Coordinator will remain available as needed.                               Care Plan: Chronic Pain       Problem: Chronic Pain is Not Self-Managed       Goal: Demonstrate improved self-management of chronic pain       Start Date: 11/21/2023 Expected End Date: 3/19/2024    This Visit's Progress: 10% Recent Progress: 10%    Note:     Barriers: Finances; Functional status (neuropathy); Provider availability - wait time to complete appointments.   Strengths:  Motivated; Agreeable to Care Coordination.  Patient expressed understanding of goal: Yes.   Action steps to achieve this goal:  1. I will discuss and follow up with my Primary Care Provider for improved pain control.   2. I will follow the recommendations of my Primary Care Provider.   3. I will contact my care team with questions, concerns or support needs. I will use the clinic as a resource and I understand I can contact my clinic with 24/7 after hours services available. Care Coordinator will remain available as needed.                               Care Plan: Resources       Problem: Resources for Honoring Choices, Prescription assistance, Low cost dental clinics       Note:     Plan: RN CC will send patient introduction letter with contact information, resources for Honoring Choices, prescription assistance, Insulin programs, low cost/free dental clinics, glasses & hearing aids [facility]. RN CC ensured patient had contact information for any needs prior to receiving introduction letter.        Goal: Resources for Resources for Honoring Choices, Prescription assistance, Low cost dental clinics       Start Date: 11/17/2023    This Visit's Progress: 90% Recent Progress: 20%    Note:     Barriers: Finances; Functional status (neuropathy); Provider availability - wait time to complete appointments.   Strengths: Motivated; Agreeable to Care Coordination.  Patient expressed understanding of goal: Yes  Action steps to achieve this goal:  1. I will follow up on the resources for Honoring Choices.  2. I will review the resources for prescription assistance, Insulin programs, low cost/free dental clinics.   3. I will follow up on the resources for glasses & hearing aids  4. I will contact my care team with questions, concerns or support needs. I will use the clinic as a resource and I understand I can contact my clinic with 24/7 after hours services available. Care Coordinator will remain available as needed.                                Intervention and Education during outreach:     CHW was able to connect with the Patient and review their above goals. Patient shared that they were able to select a different health insurance plan through [employer]. Now, her copay is 25 dollars for her insulin. States that it is way better coverage. Patient shares that she plans to check with her health insurance and then request an order for the in-network CPAP machine be sent to an appropriate DME company.    Patient shares that she did get the resources sent by CC RN and has upcoming appointments this year scheduled for vision, dental, and hearing!    Patient shares she had no additional questions or concerns during the time of call. Encouraged the Patient to reach out to the Writer previous to next CC outreach as needed; Patient voiced understanding.    CHW Plan: Writer will reach out to the Patient in 1 month to monitor the progression of their goals.      Cherrie REYES [facility]  Community Health Worker  [facility] Clinics:  [facility] Care Coordination  [phone]

## 2024-01-02 ENCOUNTER — PATIENT OUTREACH (OUTPATIENT)
Dept: CARE COORDINATION | Facility: CLINIC | Age: 59
End: 2024-01-02
Payer: COMMERCIAL

## 2024-01-02 NOTE — PROGRESS NOTES
Clinic Care Coordination Contact  Care Coordination Clinician Chart Review    Situation: Patient chart reviewed by Care Coordinator.       Background: Care Coordination Program started: 11/7/2023. Initial assessment completed and patient-centered care plan(s) were developed with participation from patient. Lead CC handed patient off to CHW for continued outreaches.       Assessment: Per chart review, patient outreach completed by CC CHW on 12/19/23.  Patient is actively working to accomplish goal(s). Patient's goal(s) appropriate and relevant at this time. Patient is not due for updated Plan of Care.  Assessments will be completed annually or as needed/with change of patient status.      Care Plan: Financial Wellbeing       Problem: Patient expresses financial resource strain       Goal: Create an action plan to increase financial stability       Start Date: 11/21/2023 Expected End Date: 4/23/2024    This Visit's Progress: 0% Recent Progress: 0%    Note:     Barriers: Finances; Functional status (neuropathy); Provider availability - wait time to complete appointments.   Strengths: Motivated; Agreeable to Care Coordination.  Patient expressed understanding of goal: Yes.   Action steps to achieve this goal:  1. I will work with the Financial Resource Worker to see if I qualify for any [facility] assistance.   2. I will review resources provided by Care Coordinator and follow up as needed/appropriate.   3. I will contact my care team with questions, concerns or support needs. I will use the clinic as a resource and I understand I can contact my clinic with 24/7 after hours services available. Care Coordinator will remain available as needed.                               Care Plan: Chronic Pain       Problem: Chronic Pain is Not Self-Managed       Goal: Demonstrate improved self-management of chronic pain       Start Date: 11/21/2023 Expected End Date: 3/19/2024    This Visit's Progress: 10% Recent Progress: 10%    Note:      Barriers: Finances; Functional status (neuropathy); Provider availability - wait time to complete appointments.   Strengths: Motivated; Agreeable to Care Coordination.  Patient expressed understanding of goal: Yes.   Action steps to achieve this goal:  1. I will discuss and follow up with my Primary Care Provider for improved pain control.   2. I will follow the recommendations of my Primary Care Provider.   3. I will contact my care team with questions, concerns or support needs. I will use the clinic as a resource and I understand I can contact my clinic with 24/7 after hours services available. Care Coordinator will remain available as needed.                               Care Plan: Resources       Problem: Resources for Honoring Choices, Prescription assistance, Low cost dental clinics       Note:     Plan: RN CC will send patient introduction letter with contact information, resources for Honoring Choices, prescription assistance, Insulin programs, low cost/free dental clinics, glasses & hearing aids via the [facility]. RN CC ensured patient had contact information for any needs prior to receiving introduction letter.        Goal: Resources for Resources for Honoring Choices, Prescription assistance, Low cost dental clinics       Start Date: 11/17/2023    This Visit's Progress: 90% Recent Progress: 20%    Note:     Barriers: Finances; Functional status (neuropathy); Provider availability - wait time to complete appointments.   Strengths: Motivated; Agreeable to Care Coordination.  Patient expressed understanding of goal: Yes  Action steps to achieve this goal:  1. I will follow up on the resources for Honoring Choices.  2. I will review the resources for prescription assistance, Insulin programs, low cost/free dental clinics.   3. I will follow up on the resources for glasses & hearing aids  4. I will contact my care team with questions, concerns or support needs. I will use the clinic as a resource and I  understand I can contact my clinic with 24/7 after hours services available. Care Coordinator will remain available as needed.                                  Plan/Recommendations: The patient will continue working with Care Coordination to achieve goal(s) as above. CHW will continue outreaches at minimum every 30 days and will involve Lead CC as needed or if patient is ready to move to Maintenance. Lead CC will continue to monitor CHW outreaches and patient's progress to goal(s) every 6 weeks.     Plan of Care updated and sent to patient: Elisha Salas RN, BSN, CPHN, CM  [facility] Ambulatory Care Management  [facility]  Phone: [phone]  Email: [email or web address]

## 2024-01-05 ENCOUNTER — TELEPHONE (OUTPATIENT)
Dept: FAMILY MEDICINE | Facility: CLINIC | Age: 59
End: 2024-01-05
Payer: COMMERCIAL

## 2024-01-05 NOTE — TELEPHONE ENCOUNTER
Forms/Letter Request    Type of form/letter: [employer] Patient Asst. Prog. Application - Health  Care Provider / Prescriber Section -     Have you been seen for this request: No    Do we have the form/letter: Yes:     Who is the form from? Patient     Where did/will the form come from? form was faxed in    When is form/letter needed by: asap    How would you like the form/letter returned:     Patient Notified form requests are processed in 3-5 business days:No    Could we send this information to you in [employer] or would you prefer to receive a phone call?:   Patient would prefer a phone call   Okay to leave a detailed message?: Yes at Cell number on file:    Telephone Information:   Mobile [phone]     Mariann CHELO    Put in providers in box to sign

## 2024-01-08 NOTE — TELEPHONE ENCOUNTER
[facility] and also filed in [facility].

## 2024-01-11 ENCOUNTER — TELEPHONE (OUTPATIENT)
Dept: FAMILY MEDICINE | Facility: CLINIC | Age: 59
End: 2024-01-11
Payer: COMMERCIAL

## 2024-01-11 NOTE — TELEPHONE ENCOUNTER
Home Health Care      Reason for call:  [facility] Denial notification - needs signature      Pt Provider: barrie    Phone Number Homecare Nurse can be reached at: Fax  1  [phone]      Could we send this information to you in [employer] or would you prefer to receive a phone call?:   No preference     Okay to leave a detailed message?:  na    Put in providers in box    Mariann K

## 2024-01-11 NOTE — TELEPHONE ENCOUNTER
This encounter was sent 7 hours ago and I do not have form associated with this encounter.     - Guillermo, CNP

## 2024-01-12 NOTE — TELEPHONE ENCOUNTER
Faxed signed forms to [facility]  #1 [phone]    Copied to [facility] // Filed in [facility] //  Mariann WHITNEY

## 2024-01-22 ENCOUNTER — TELEPHONE (OUTPATIENT)
Dept: FAMILY MEDICINE | Facility: CLINIC | Age: 59
End: 2024-01-22
Payer: COMMERCIAL

## 2024-01-22 NOTE — TELEPHONE ENCOUNTER
Forms/Letter Request    Type of form/letter: Form dropped of by pt - Health care provider- form was filled  out previously and needs updating.    Do we have the form/letter: Yes:     Who is the form from? Patient    Where did/will the form come from? Patient or family brought in       When is form/letter needed by: asap    How would you like the form/letter returned: Pt to PU    Patient Notified form requests are processed in 5-7 business days:No    Could we send this information to you in [facility] or would you prefer to receive a phone call?:   Patient would prefer a phone call   Okay to leave a detailed message?: Yes at Cell number on file:    Telephone Information:   Mobile [phone]     Put In  providers in box    Mariann WHITNEY

## 2024-01-23 NOTE — TELEPHONE ENCOUNTER
Cld pt to advise forms have been filled out - Healthcare  Provider / Prescriber section.  Copies put up front for them to [employer] paperwork Is ready.    Copied to [facility]  // Filed [place] / Mariann WHITNEY

## 2024-01-25 ENCOUNTER — PATIENT OUTREACH (OUTPATIENT)
Dept: CARE COORDINATION | Facility: CLINIC | Age: 59
End: 2024-01-25
Payer: COMMERCIAL

## 2024-01-25 NOTE — PROGRESS NOTES
Clinic Care Coordination Contact  [facility]    Clinical Data: Care Coordinator Outreach    Outreach Documentation Number of Outreach Attempt   1/25/2024  11:30 AM 1       CHW briefly spoke with the Patient, however now was not a good time to talk. Patient would like a call in 1 week.    Plan: Care Coordinator will try to reach patient again in 3-5 business days.    Cherrie REYES [facility]  Community Health Worker  [facility] Clinics:  [place], [place] & [facility] Care Coordination  [phone]

## 2024-01-29 NOTE — TELEPHONE ENCOUNTER
Alee from [employer] is calling to report that the original forms had Jessy García's NPI # missing, and [employer] was requested on form and [employer] does not manufacture this so they do not cover. Alee will refax form to be redone. Awaiting fax.

## 2024-02-05 ENCOUNTER — PATIENT OUTREACH (OUTPATIENT)
Dept: CARE COORDINATION | Facility: CLINIC | Age: 59
End: 2024-02-05
Payer: COMMERCIAL

## 2024-02-05 NOTE — PROGRESS NOTES
Clinic Care Coordination Contact  Community Health Worker Follow Up    Care Gaps:     Health Maintenance Due   Topic Date Due    ADVANCE CARE PLANNING  Never done    ZOSTER IMMUNIZATION (1 of 2) Never done    COLORECTAL CANCER SCREENING  12/02/2021    DIABETIC FOOT EXAM  12/14/2022    EYE EXAM  03/01/2023    RSV VACCINE (Pregnancy & 60+) (1 - 1-dose 60+ series) Never done    INFLUENZA VACCINE (1) Never done    COVID-19 Vaccine (3 - 2023-24 season) 09/01/2023    MAMMO SCREENING  11/24/2023    PHQ-2 (once per calendar year)  01/01/2024       Writer unable to address on this date.    Care Plan:   Care Plan: Financial Wellbeing Completed 2/6/2024      Problem: Patient expresses financial resource strain  Resolved 2/6/2024      Goal: Create an action plan to increase financial stability  Completed 2/6/2024      Start Date: 11/21/2023 Expected End Date: 4/23/2024    This Visit's Progress: 100% Recent Progress: 0%    Note:     Barriers: Finances; Functional status (neuropathy); Provider availability - wait time to complete appointments.   Strengths: Motivated; Agreeable to Care Coordination.  Patient expressed understanding of goal: Yes.   Action steps to achieve this goal:  1. I will work with the Financial Resource Worker to see if I qualify for any county assistance.   2. I will review resources provided by Care Coordinator and follow up as needed/appropriate.   3. I will contact my care team with questions, concerns or support needs. I will use the clinic as a resource and I understand I can contact my clinic with 24/7 after hours services available. Care Coordinator will remain available as needed.                               Care Plan: Chronic Pain Completed 2/6/2024      Problem: Chronic Pain is Not Self-Managed  Resolved 2/6/2024      Goal: Demonstrate improved self-management of chronic pain  Completed 2/6/2024      Start Date: 11/21/2023 Expected End Date: 3/19/2024    This Visit's Progress: 100% Recent  Progress: 10%    Note:     Barriers: Finances; Functional status (neuropathy); Provider availability - wait time to complete appointments.   Strengths: Motivated; Agreeable to Care Coordination.  Patient expressed understanding of goal: Yes.   Action steps to achieve this goal:  1. I will discuss and follow up with my Primary Care Provider for improved pain control.   2. I will follow the recommendations of my Primary Care Provider.   3. I will contact my care team with questions, concerns or support needs. I will use the clinic as a resource and I understand I can contact my clinic with 24/7 after hours services available. Care Coordinator will remain available as needed.   Patient continuing to follow-up with her PCP as needed.                              Care Plan: Resources Completed 2/6/2024      Problem: Resources for Honoring Choices, Prescription assistance, Low cost dental clinics  Resolved 2/6/2024      Note:     Plan: RN CC will send patient introduction letter with contact information, resources for Honoring Choices, prescription assistance, Insulin programs, low cost/free dental clinics, glasses & hearing aids via [facility]. RN CC ensured patient had contact information for any needs prior to receiving introduction letter.        Goal: Resources for Resources for Honoring Choices, Prescription assistance, Low cost dental clinics  Completed 2/6/2024      Start Date: 11/17/2023    This Visit's Progress: 100% Recent Progress: 90%    Note:     Barriers: Finances; Functional status (neuropathy); Provider availability - wait time to complete appointments.   Strengths: Motivated; Agreeable to Care Coordination.  Patient expressed understanding of goal: Yes  Action steps to achieve this goal:  1. I will follow up on the resources for Honoring Choices.  2. I will review the resources for prescription assistance, Insulin programs, low cost/free dental clinics.   3. I will follow up on the resources for glasses &  hearing aids  4. I will contact my care team with questions, concerns or support needs. I will use the clinic as a resource and I understand I can contact my clinic with 24/7 after hours services available. Care Coordinator will remain available as needed.                               Intervention and Education during outreach:     CHW was able to connect with the Patient to review their above goals. Patient shares that she is working with the care team in order to obtain insulin financial assistance; Writer voiced understanding.  Writer inquired if the Patient would like to revisit [facility] for outstanding medical bills, however the Patient declines.  Inquired if the Patient would like to discuss ACP further. Patient declines and states they received the paperwork previously sent by CC RN; Writer voiced understanding. Patient has ACP info to utilize as they see fit.    No additional CC needs identified. Writer explained the purpose of transitioning into maintenance. Patient is agreeable to this.      CHW Plan: Writer will reach out to the Patient in 2 months to monitor the progression of their goals.        Cherrie REYES Public [facility]  Community Health Worker  [facility] Clinics:  [facility] & [facility] Care Coordination  [phone]

## 2024-02-07 ENCOUNTER — PATIENT OUTREACH (OUTPATIENT)
Dept: CARE COORDINATION | Facility: CLINIC | Age: 59
End: 2024-02-07
Payer: COMMERCIAL

## 2024-02-07 NOTE — PROGRESS NOTES
Clinic Care Coordination Contact  Patient has completed all goals with Clinic Care Coordination.  RN CC has reviewed the chart and confirmed that maintenance  is approved. Updated CHW.     Jayda Salas RN, BSN, CPHN, CCM  [facility] Ambulatory Care Management  [facility]  Phone: [phone]  Email: [email or web address]

## 2024-02-09 DIAGNOSIS — Z79.4 TYPE 2 DIABETES MELLITUS WITH DIABETIC NEUROPATHY, WITH LONG-TERM CURRENT USE OF INSULIN (H): ICD-10-CM

## 2024-02-09 DIAGNOSIS — E11.40 TYPE 2 DIABETES MELLITUS WITH DIABETIC NEUROPATHY, WITH LONG-TERM CURRENT USE OF INSULIN (H): ICD-10-CM

## 2024-02-13 ENCOUNTER — PATIENT OUTREACH (OUTPATIENT)
Dept: CARE COORDINATION | Facility: CLINIC | Age: 59
End: 2024-02-13
Payer: COMMERCIAL

## 2024-02-13 NOTE — PROGRESS NOTES
Clinic Care Coordination Contact  Care Coordination Clinician Chart Review    Situation: Patient chart reviewed by Care Coordinator.       Background: Care Coordination Program started: 11/7/2023. Initial assessment completed and patient-centered care plan(s) were developed with participation from patient. Lead CC handed patient off to CHW for continued outreaches.       Assessment: Per chart review, patient outreach completed by CC CHW on 2/5/24.  Patient is actively working to accomplish goal(s). Patient's goal(s) appropriate and relevant at this time. Patient is due for updated Plan of Care.  Assessments will be completed annually or as needed/with change of patient status.      Care Plan: Financial Wellbeing Completed 2/6/2024      Problem: Patient expresses financial resource strain  Resolved 2/6/2024      Goal: Create an action plan to increase financial stability  Completed 2/6/2024      Start Date: 11/21/2023 Expected End Date: 4/23/2024    This Visit's Progress: 100% Recent Progress: 0%    Note:     Barriers: Finances; Functional status (neuropathy); Provider availability - wait time to complete appointments.   Strengths: Motivated; Agreeable to Care Coordination.  Patient expressed understanding of goal: Yes.   Action steps to achieve this goal:  1. I will work with the Financial Resource Worker to see if I qualify for any [facility] assistance.   2. I will review resources provided by Care Coordinator and follow up as needed/appropriate.   3. I will contact my care team with questions, concerns or support needs. I will use the clinic as a resource and I understand I can contact my clinic with 24/7 after hours services available. Care Coordinator will remain available as needed.                               Care Plan: Chronic Pain Completed 2/6/2024      Problem: Chronic Pain is Not Self-Managed  Resolved 2/6/2024      Goal: Demonstrate improved self-management of chronic pain  Completed 2/6/2024      Start  Date: 11/21/2023 Expected End Date: 3/19/2024    This Visit's Progress: 100% Recent Progress: 10%    Note:     Barriers: Finances; Functional status (neuropathy); Provider availability - wait time to complete appointments.   Strengths: Motivated; Agreeable to Care Coordination.  Patient expressed understanding of goal: Yes.   Action steps to achieve this goal:  1. I will discuss and follow up with my Primary Care Provider for improved pain control.   2. I will follow the recommendations of my Primary Care Provider.   3. I will contact my care team with questions, concerns or support needs. I will use the clinic as a resource and I understand I can contact my clinic with 24/7 after hours services available. Care Coordinator will remain available as needed.   Patient continuing to follow-up with her PCP as needed.                              Care Plan: Resources Completed 2/6/2024      Problem: Resources for Honoring Choices, Prescription assistance, Low cost dental clinics  Resolved 2/6/2024      Note:     Plan: RN CC will send patient introduction letter with contact information, resources for Honoring Choices, prescription assistance, Insulin programs, low cost/free dental clinics, glasses & hearing aids via the [facility]. RN CC ensured patient had contact information for any needs prior to receiving introduction letter.        Goal: Resources for Resources for Honoring Choices, Prescription assistance, Low cost dental clinics  Completed 2/6/2024      Start Date: 11/17/2023    This Visit's Progress: 100% Recent Progress: 90%    Note:     Barriers: Finances; Functional status (neuropathy); Provider availability - wait time to complete appointments.   Strengths: Motivated; Agreeable to Care Coordination.  Patient expressed understanding of goal: Yes  Action steps to achieve this goal:  1. I will follow up on the resources for Honoring Choices.  2. I will review the resources for prescription assistance, Insulin programs,  low cost/free dental clinics.   3. I will follow up on the resources for glasses & hearing aids  4. I will contact my care team with questions, concerns or support needs. I will use the clinic as a resource and I understand I can contact my clinic with 24/7 after hours services available. Care Coordinator will remain available as needed.                               Plan/Recommendations: The patient will continue working with Care Coordination to achieve goal(s) as above. CHW will continue outreaches at minimum every 30 days and will involve Lead CC as needed or if patient is ready to move to Maintenance. Lead CC will continue to monitor CHW outreaches and patient's progress to goal(s) every 6 weeks.     Plan of Care updated and sent to patient: Yes[facility]    Jayda Salas, RN, BSN, CPHN, CCM  [facility] Ambulatory Care Management  [facility]  Phone: [phone]  Email: [email or web address]

## 2024-02-13 NOTE — LETTER
Dear Jayda,   Attached is an updated Patient Centered Plan of Care for your continued enrollment in Care Coordination. Please let us know if you have additional questions, concerns or goals that we can assist with.     Sincerely,   Jayda Salas RN, BSN, CPHN, CM  [facility] Ambulatory Care Management  [facility]  Phone: [phone]  Email: [email or web address]  Patient Centered Plan of Care  About Me:        Patient Name:  Jayda Berrios    YOB: 1965  Age:         58 year old   [place] MRN:    [id number] Telephone Information:  Home Phone Not on file.   Mobile [phone]       Address:  [facility] [address] [facility] [address] Email address:  [email or web address]      Emergency Contact(s)    Name Relationship Lgl Grd Work Phone Home Phone Mobile Phone           Primary language:  English     needed? No   [place] [employer]:  [phone] op. 1  Other communication barriers:None    Preferred Method of Communication:     Current living arrangement: I live in a private home with spouse    Mobility Status/ Medical Equipment: Independent    Health Maintenance  Health Maintenance Reviewed: Due/Overdue (Discussed with patient.)    My Access Plan  Medical Emergency 911   Primary Clinic Line [facility] - [phone]   24 Hour Appointment Line [phone] or  [phone]) (toll-free)   24 Hour Nurse Line [phone] (toll-free)   Preferred Urgent Care No data recorded   Preferred Hospital [facility]  [phone]     Preferred Pharmacy [facility] [address] [place] [facility] [address] [address]     Behavioral Health Crisis Line The National Suicide Prevention Lifeline at 1-174.628.6025 or Text/Call 278     My Care Team Members  Patient Care Team         Relationship Specialty Notifications Start End    Jessy García, APRN CNP PCP - General  Family Medicine  10/3/22     Phone: [phone] Fax: [phone]         [address] [facility] [address]    Marium Galarza MD MD Nephrology  8/16/21     Phone: [phone] Fax: [phone]         [address] [facility] [address]    Seble Agarwal PA-C Assigned Sleep Provider   2/18/23     Phone: [phone] Fax: [phone] [address] [place] [address]    Jessy García, APRN CNP Assigned PCP   5/13/23     Phone: [phone] Fax: [phone] [address] [facility] [address]    Jayda Salas, RN Lead Care Coordinator Primary Care - CC Admissions 11/10/23     Phone: [phone]                   My Care Plans  Self Management and Treatment Plan    Care Plan - Completed    Action Plans on File:     Advance Care Plans/Directives:   Advanced Care Plan/Directives on file:   No    Discussed with patient/caregiver(s):   Referral to Bonita León             My Medical and Care Information  Problem List   Patient Active Problem List   Diagnosis    Type 2 diabetes mellitus with diabetic neuropathy, with long-term current use of insulin (H)    Stage 3b chronic kidney disease (H)    Diabetic peripheral neuropathy (H)    S/P hysterectomy    Gastroesophageal reflux disease without esophagitis    Essential hypertension    Hypertriglyceridemia    BARRINGTON (obstructive sleep apnea)      Current Medications and Allergies:    Current Outpatient Medications   Medication    amLODIPine (NORVASC) 10 MG tablet    aspirin (ASA) 81 MG EC tablet    blood glucose (ACCU-CHEK SMARTVIEW) test strip    blood glucose ([employer] BLOOD GLUCOSE TEST) test strip    blood glucose (NO BRAND SPECIFIED) test strip    blood glucose calibration (NO BRAND SPECIFIED) solution    blood glucose monitoring (NO BRAND SPECIFIED) meter device kit    carvedilol (COREG) 12.5 MG tablet    cyclobenzaprine (FLEXERIL) 5 MG tablet    estradiol (ESTRACE) 2 MG tablet    fenofibrate  (TRIGLIDE/LOFIBRA) 160 MG tablet    hydrochlorothiazide (HYDRODIURIL) 12.5 MG tablet    insulin aspart (NOVOLOG PEN) 100 UNIT/ML pen    insulin glargine (BASAGLAR KWIKPEN) 100 UNIT/ML pen    insulin pen needle (32G X 4 MM) 32G X 4 MM miscellaneous    metFORMIN (GLUCOPHAGE) 500 MG tablet    nortriptyline (PAMELOR) 25 MG capsule    omeprazole (PRILOSEC) 40 MG DR capsule    ondansetron (ZOFRAN) 4 MG tablet    STATIN NOT PRESCRIBED (INTENTIONAL)    SUMAtriptan (IMITREX) 50 MG tablet    thin (NO BRAND SPECIFIED) lancets     Current Facility-Administered Medications   Medication    lidocaine 1 % injection 2 mL    triamcinolone (KENALOG-40) injection 40 mg      Allergies   Allergen Reactions    Iodine Anaphylaxis    Peanut Oil Anaphylaxis    Statins [Statins] Muscle Pain (Myalgia)     Patient reports that she tried all statins and was not able to tolerate any of them due to severe muscle pains.     Ibuprofen Hives    Lisinopril Cough and Other (See Comments)    Codeine Rash     Care Coordination Start Date: 11/7/2023   Frequency of Care Coordination: monthly, more frequently as needed     Form Last Updated: 02/13/2024

## 2024-02-16 ENCOUNTER — TELEPHONE (OUTPATIENT)
Dept: FAMILY MEDICINE | Facility: CLINIC | Age: 59
End: 2024-02-16
Payer: COMMERCIAL

## 2024-02-16 NOTE — TELEPHONE ENCOUNTER
NovoLog FlexPen 4 boxes delivered for pt through the [facility]    They are in the [facility] [place].    Pt will pick them up on Monday - 02/19/2024    Endy NICE CMA

## 2024-02-27 ENCOUNTER — MYC MEDICAL ADVICE (OUTPATIENT)
Dept: FAMILY MEDICINE | Facility: CLINIC | Age: 59
End: 2024-02-27
Payer: COMMERCIAL

## 2024-02-27 DIAGNOSIS — I10 HYPERTENSION GOAL BP (BLOOD PRESSURE) < 140/90: ICD-10-CM

## 2024-02-28 RX ORDER — AMLODIPINE BESYLATE 10 MG/1
10 TABLET ORAL DAILY
Qty: 90 TABLET | Refills: 3 | Status: SHIPPED | OUTPATIENT
Start: 2024-02-28 | End: 2024-03-26

## 2024-02-28 NOTE — TELEPHONE ENCOUNTER
Patient says she is needing new prescription to new pharmacy as previous prescription won't tranfers over. Routing to primary care provider.    Thank you,  Julian Gabriel, Triage RN Venita Boyle  9:43 AM 2/28/2024

## 2024-03-23 ENCOUNTER — HEALTH MAINTENANCE LETTER (OUTPATIENT)
Age: 59
End: 2024-03-23

## 2024-03-26 ENCOUNTER — MYC MEDICAL ADVICE (OUTPATIENT)
Dept: FAMILY MEDICINE | Facility: CLINIC | Age: 59
End: 2024-03-26
Payer: COMMERCIAL

## 2024-03-26 DIAGNOSIS — Z79.4 TYPE 2 DIABETES MELLITUS WITH DIABETIC NEUROPATHY, WITH LONG-TERM CURRENT USE OF INSULIN (H): ICD-10-CM

## 2024-03-26 DIAGNOSIS — Z90.710 S/P HYSTERECTOMY: ICD-10-CM

## 2024-03-26 DIAGNOSIS — E11.40 TYPE 2 DIABETES MELLITUS WITH DIABETIC NEUROPATHY, WITH LONG-TERM CURRENT USE OF INSULIN (H): ICD-10-CM

## 2024-03-26 DIAGNOSIS — I10 HYPERTENSION GOAL BP (BLOOD PRESSURE) < 140/90: ICD-10-CM

## 2024-03-26 DIAGNOSIS — E11.42 DIABETIC PERIPHERAL NEUROPATHY (H): ICD-10-CM

## 2024-03-26 DIAGNOSIS — E78.5 HYPERLIPIDEMIA LDL GOAL <100: ICD-10-CM

## 2024-03-26 DIAGNOSIS — I10 ESSENTIAL HYPERTENSION: Primary | ICD-10-CM

## 2024-03-26 NOTE — TELEPHONE ENCOUNTER
LOV 12/6/2023      Please see my chart message below     Please review and advise     Thank you     Rocio Zeng RN, BSN  [place] Triage

## 2024-03-27 RX ORDER — CARVEDILOL 12.5 MG/1
12.5 TABLET ORAL 2 TIMES DAILY WITH MEALS
Qty: 180 TABLET | Refills: 1 | Status: SHIPPED | OUTPATIENT
Start: 2024-03-27

## 2024-03-27 RX ORDER — FENOFIBRATE 160 MG/1
TABLET ORAL
Qty: 90 TABLET | Refills: 1 | Status: SHIPPED | OUTPATIENT
Start: 2024-03-27

## 2024-03-28 RX ORDER — ESTRADIOL 2 MG/1
2 TABLET ORAL DAILY
Qty: 90 TABLET | Refills: 2 | Status: SHIPPED | OUTPATIENT
Start: 2024-03-28

## 2024-03-28 RX ORDER — HYDROCHLOROTHIAZIDE 12.5 MG/1
12.5 TABLET ORAL DAILY
Qty: 90 TABLET | Refills: 2 | Status: SHIPPED | OUTPATIENT
Start: 2024-03-28

## 2024-03-28 RX ORDER — AMLODIPINE BESYLATE 10 MG/1
10 TABLET ORAL DAILY
Qty: 90 TABLET | Refills: 2 | Status: SHIPPED | OUTPATIENT
Start: 2024-03-28

## 2024-04-10 ENCOUNTER — PATIENT OUTREACH (OUTPATIENT)
Dept: CARE COORDINATION | Facility: CLINIC | Age: 59
End: 2024-04-10
Payer: COMMERCIAL

## 2024-04-10 NOTE — PROGRESS NOTES
Clinic Care Coordination Contact  [facility]/Voicemail    Clinical Data: Care Coordinator Outreach    Outreach Documentation Number of Outreach Attempt   4/10/2024   3:11 PM 1       Left message on patient's voicemail with call back information and requested return call.    Plan: Care Coordinator will try to reach patient again in 10 business days.      Cherrie REYES [facility]  Community Health Worker  [facility] Clinics:  [employer], [place] [facility] Care Coordination  [phone]

## 2024-04-12 ENCOUNTER — PATIENT OUTREACH (OUTPATIENT)
Dept: CARE COORDINATION | Facility: CLINIC | Age: 59
End: 2024-04-12
Payer: COMMERCIAL

## 2024-04-12 NOTE — PROGRESS NOTES
Clinic Care Coordination Contact  Care Coordination Clinician Chart Review    Situation: Patient chart reviewed by Care Coordinator.       Background: Care Coordination Program started: 11/7/2023. Initial assessment completed and patient-centered care plan(s) were developed with participation from patient. Lead CC handed patient off to CHW for continued outreaches.       Assessment: Per chart review, patient outreach attempted by CC CHW on 4/10/24.  Per standard of work protocol, CHW will attempt outreach again in 10 business days. Patient is actively working to accomplish goal(s) per last successful outreach on 2/5/24. Patient's goal(s) appropriate and relevant at this time. Patient is not due for updated Plan of Care.  Assessments will be completed annually or as needed/with change of patient status.      Care Plan: Financial Wellbeing Completed 2/6/2024      Problem: Patient expresses financial resource strain  Resolved 2/6/2024      Goal: Create an action plan to increase financial stability  Completed 2/6/2024      Start Date: 11/21/2023 Expected End Date: 4/23/2024    This Visit's Progress: 100% Recent Progress: 0%    Note:     Barriers: Finances; Functional status (neuropathy); Provider availability - wait time to complete appointments.   Strengths: Motivated; Agreeable to Care Coordination.  Patient expressed understanding of goal: Yes.   Action steps to achieve this goal:  1. I will work with the Financial Resource Worker to see if I qualify for any county assistance.   2. I will review resources provided by Care Coordinator and follow up as needed/appropriate.   3. I will contact my care team with questions, concerns or support needs. I will use the clinic as a resource and I understand I can contact my clinic with 24/7 after hours services available. Care Coordinator will remain available as needed.                               Care Plan: Chronic Pain Completed 2/6/2024      Problem: Chronic Pain is Not  Self-Managed  Resolved 2/6/2024      Goal: Demonstrate improved self-management of chronic pain  Completed 2/6/2024      Start Date: 11/21/2023 Expected End Date: 3/19/2024    This Visit's Progress: 100% Recent Progress: 10%    Note:     Barriers: Finances; Functional status (neuropathy); Provider availability - wait time to complete appointments.   Strengths: Motivated; Agreeable to Care Coordination.  Patient expressed understanding of goal: Yes.   Action steps to achieve this goal:  1. I will discuss and follow up with my Primary Care Provider for improved pain control.   2. I will follow the recommendations of my Primary Care Provider.   3. I will contact my care team with questions, concerns or support needs. I will use the clinic as a resource and I understand I can contact my clinic with 24/7 after hours services available. Care Coordinator will remain available as needed.   Patient continuing to follow-up with her PCP as needed.                              Care Plan: Resources Completed 2/6/2024      Problem: Resources for Honoring Choices, Prescription assistance, Low cost dental clinics  Resolved 2/6/2024      Note:     Plan: RN CC will send patient introduction letter with contact information, resources for Honoring Choices, prescription assistance, Insulin programs, low cost/free dental clinics, glasses & hearing aids via the [facility]. RN CC ensured patient had contact information for any needs prior to receiving introduction letter.        Goal: Resources for Resources for Honoring Choices, Prescription assistance, Low cost dental clinics  Completed 2/6/2024      Start Date: 11/17/2023    This Visit's Progress: 100% Recent Progress: 90%    Note:     Barriers: Finances; Functional status (neuropathy); Provider availability - wait time to complete appointments.   Strengths: Motivated; Agreeable to Care Coordination.  Patient expressed understanding of goal: Yes  Action steps to achieve this goal:  1. I will  follow up on the resources for Honoring Choices.  2. I will review the resources for prescription assistance, Insulin programs, low cost/free dental clinics.   3. I will follow up on the resources for glasses & hearing aids  4. I will contact my care team with questions, concerns or support needs. I will use the clinic as a resource and I understand I can contact my clinic with 24/7 after hours services available. Care Coordinator will remain available as needed.                                  Plan/Recommendations: The patient will continue working with Care Coordination to achieve goal(s) as above. CHW will continue outreaches at minimum every 30 days and will involve Lead CC as needed or if patient is ready to move to Maintenance. Lead CC will continue to monitor CHW outreaches and patient's progress to goal(s) every 6 weeks.     Plan of Care updated and sent to patient: Elisha Salas RN, BSN, CPHN, CCM  [facility] Ambulatory Care Management  [facility]  Phone: [phone]  Email: [email or web address]

## 2024-04-17 ENCOUNTER — ANCILLARY PROCEDURE (OUTPATIENT)
Dept: MAMMOGRAPHY | Facility: CLINIC | Age: 59
End: 2024-04-17
Attending: NURSE PRACTITIONER
Payer: COMMERCIAL

## 2024-04-17 DIAGNOSIS — Z12.31 VISIT FOR SCREENING MAMMOGRAM: ICD-10-CM

## 2024-04-17 PROCEDURE — 77067 SCR MAMMO BI INCL CAD: CPT | Mod: TC | Performed by: RADIOLOGY

## 2024-04-25 ENCOUNTER — PATIENT OUTREACH (OUTPATIENT)
Dept: CARE COORDINATION | Facility: CLINIC | Age: 59
End: 2024-04-25
Payer: COMMERCIAL

## 2024-04-25 NOTE — Clinical Note
FYI Patient has met all her goals for Care Coordination. She has graduated from Care Coordination program. If any new needs arise, please enter a new Care Coordination referral. Thank you. Jayda Salas RN, BSN, CPHN, [facility] Phone: [phone] Email: [email or web address]

## 2024-04-25 NOTE — PROGRESS NOTES
Clinic Care Coordination Contact    Assessment: Care Coordinator contacted patient for 2 month follow up.  Patient has continued to follow the plan of care and assessment is negative for any new needs or concerns.    Enrollment status: Graduated.      Plan: No further outreaches at this time.  Patient will continue to follow the plan of care.  If new needs arise a new Care Coordination referral may be placed.  FYI to PCP    Jayda Salas RN, BSN, CPHN, CCM  [facility] Ambulatory Care Management  [facility]  Phone: [phone]  Email: [email or web address]    Clinic Care Coordination Contact  Patient has completed all goals with Clinic Care Coordination.  Please review the chart and confirm if graduation is approved.     CHW was able to connect with the Patient for a 2 month check-in. Patient shares she is doing well and got all of the resources she was needing.  Reviewed that she can reach out to CC in the future if needed; Patient voiced understanding.     No additional CC needs identified during the time of call.     Cherrie REYES [facility]  Community Health Worker  [facility]:  [facility] Care Coordination  [phone]

## 2024-07-01 ENCOUNTER — TRANSFERRED RECORDS (OUTPATIENT)
Dept: MULTI SPECIALTY CLINIC | Facility: CLINIC | Age: 59
End: 2024-07-01

## 2024-07-01 LAB — RETINOPATHY: NORMAL

## 2024-08-10 ENCOUNTER — HEALTH MAINTENANCE LETTER (OUTPATIENT)
Age: 59
End: 2024-08-10

## 2024-09-16 ENCOUNTER — MYC MEDICAL ADVICE (OUTPATIENT)
Dept: FAMILY MEDICINE | Facility: CLINIC | Age: 59
End: 2024-09-16
Payer: COMMERCIAL

## 2024-09-16 DIAGNOSIS — Z79.4 TYPE 2 DIABETES MELLITUS WITH DIABETIC NEUROPATHY, WITH LONG-TERM CURRENT USE OF INSULIN (H): ICD-10-CM

## 2024-09-16 DIAGNOSIS — E11.40 TYPE 2 DIABETES MELLITUS WITH DIABETIC NEUROPATHY, WITH LONG-TERM CURRENT USE OF INSULIN (H): ICD-10-CM

## 2024-09-30 ENCOUNTER — MYC MEDICAL ADVICE (OUTPATIENT)
Dept: FAMILY MEDICINE | Facility: CLINIC | Age: 59
End: 2024-09-30
Payer: COMMERCIAL

## 2024-10-07 ENCOUNTER — TELEPHONE (OUTPATIENT)
Dept: FAMILY MEDICINE | Facility: CLINIC | Age: 59
End: 2024-10-07
Payer: COMMERCIAL

## 2024-10-07 ENCOUNTER — TRANSFERRED RECORDS (OUTPATIENT)
Dept: HEALTH INFORMATION MANAGEMENT | Facility: CLINIC | Age: 59
End: 2024-10-07
Payer: COMMERCIAL

## 2024-10-07 DIAGNOSIS — Z79.4 TYPE 2 DIABETES MELLITUS WITH DIABETIC NEUROPATHY, WITH LONG-TERM CURRENT USE OF INSULIN (H): ICD-10-CM

## 2024-10-07 DIAGNOSIS — E11.40 TYPE 2 DIABETES MELLITUS WITH DIABETIC NEUROPATHY, WITH LONG-TERM CURRENT USE OF INSULIN (H): ICD-10-CM

## 2024-10-07 NOTE — TELEPHONE ENCOUNTER
Placed call to patient to verify which insulin is needed to be refilled - confirmed Novolog pen.      Placed call to Gloria Nordias regarding form, due to form not being received as of yet.    Spoke with Octavio, and he will be faxing to [facility] fax. Awaiting fax for completion.

## 2024-10-07 NOTE — TELEPHONE ENCOUNTER
Medication Question or Refill        What medication are you calling about (include dose and sig)?:     Forms for medication questions to fill out by provider    Preferred Pharmacy:       [id number] - [place], [place] - [address]  [address]  [place] [address]  Phone: [phone] Fax: [phone]      Controlled Substance Agreement on file:   CSA -- Patient Level:    CSA: None found at the patient level.       Who prescribed the medication?: Ricky BANKS    Do you need a refill? Yes    When did you use the medication last? na    Patient offered an appointment? No    Do you have any questions or concerns?  No      Could we send this information to you in [facility] or would you prefer to receive a phone call?:   chandana    Put in providers in box with med rec and letter    Mariann WHITNEY

## 2024-10-08 ENCOUNTER — TELEPHONE (OUTPATIENT)
Dept: FAMILY MEDICINE | Facility: CLINIC | Age: 59
End: 2024-10-08
Payer: COMMERCIAL

## 2024-10-08 NOTE — TELEPHONE ENCOUNTER
Faxed signed form to [employer] #[phone] and  - AND put in interoffice to Roxy Pruitt.    Pt Assistance    Copied into HIMS / Filed in [place]

## 2024-10-08 NOTE — TELEPHONE ENCOUNTER
Medication Question    Contacts       Contact Date/Time Type Contact Phone/Fax    10/08/2024 08:59 AM CDT Phone (Incoming) [facility] [address] [place] - [address] (Pharmacy) [phone]            What medication are you calling about (include dose and sig)?: Insulin Novolog Pen    Preferred Pharmacy:  [id number]  [place], [place] - [address]  [address]  [place] [address]  Phone: [phone] Fax: [phone]      Controlled Substance Agreement on file:   CSA -- Patient Level:    CSA: None found at the patient level.       Who prescribed the medication?: PCP     Do you need a refill? No    When did you use the medication last?     Patient offered an appointment? No    Do you have any questions or concerns?  Yes: insurance requires max daily dosing for novolog. Please advise updated prescription with this information      Could we send this information to you in [employer] or would you prefer to receive a phone call?:   Pharmacy prefers for PCP to resend rx with max daily dosing correction   Okay to leave a detailed message?: No at Other phone number:

## 2024-10-10 ENCOUNTER — MYC REFILL (OUTPATIENT)
Dept: FAMILY MEDICINE | Facility: CLINIC | Age: 59
End: 2024-10-10
Payer: COMMERCIAL

## 2024-10-10 DIAGNOSIS — Z79.4 TYPE 2 DIABETES MELLITUS WITH DIABETIC NEUROPATHY, WITH LONG-TERM CURRENT USE OF INSULIN (H): ICD-10-CM

## 2024-10-10 DIAGNOSIS — E11.40 TYPE 2 DIABETES MELLITUS WITH DIABETIC NEUROPATHY, WITH LONG-TERM CURRENT USE OF INSULIN (H): ICD-10-CM

## 2024-10-10 NOTE — TELEPHONE ENCOUNTER
Pharmacy calling back to report they still need provider to clarify max daily dosing for novolog. Please advise updated prescription with this information.

## 2024-10-10 NOTE — TELEPHONE ENCOUNTER
Can we get additional clarification on this?  Max dosing was written on form as requested.     Is [facility] pharmacy requesting this to be put on prescription for Novolog?     - Guillermo, CNP

## 2024-10-10 NOTE — TELEPHONE ENCOUNTER
Clinic RN: Please investigate patient's chart or contact patient if the information cannot be found because  Please contact pt to clarify comment regarding OB dose.  Nahed REVELES RN, BSN PHN

## 2024-10-10 NOTE — TELEPHONE ENCOUNTER
See other encounter      Placed call to pharmacy spoke with Hilary, advised of max daily dose of 42 units/day - written on form. Pharmacy did not need provider to resend and took verbal. Closing encounter.           ELENI COON RN on 10/10/2024 at 5:08 PM   [facility]

## 2024-10-10 NOTE — TELEPHONE ENCOUNTER
Placed call to pharmacy spoke with Hilary, advised of max daily dose of 42 units/day - written on form. Pharmacy did not need provider to resend and took verbal. Closing encounter.

## 2024-10-11 ENCOUNTER — TELEPHONE (OUTPATIENT)
Dept: FAMILY MEDICINE | Facility: CLINIC | Age: 59
End: 2024-10-11
Payer: COMMERCIAL

## 2024-10-11 NOTE — TELEPHONE ENCOUNTER
We received [employer] refill forms via inter office mail. Per Epic these have already been faxed to Gloria. Not sure why the forms were sent to us, nothing in Epic stating why.   We are currently not assisting this patient. I am sending forms back to the [facility] via inter office mail.     Thank you!    Caridad Beverly   Prescription Assistance Assistant

## 2024-10-14 NOTE — TELEPHONE ENCOUNTER
Following the work flow for this patient to receive help with medication.    Sent to [facility].    Prior form were completed by DARREN García but the license number and drug allergies were left blank.    The company sent another blank for before we knew how to help the patients.    Thank you for your help

## 2024-10-14 NOTE — TELEPHONE ENCOUNTER
[employer] Pt Assistance Program   Faxed to     Per Marisela sent interoffice to Roxy Pruitt to find out if this is how she wants these [employer] forms.  This appears to be the first time she will see this form as it came as a fax; not from her.    Copied into HIMS / Filed in [place]         Stable without medications  Has good support from family and friends

## 2024-10-17 ENCOUNTER — TELEPHONE (OUTPATIENT)
Dept: FAMILY MEDICINE | Facility: CLINIC | Age: 59
End: 2024-10-17
Payer: COMMERCIAL

## 2024-10-17 NOTE — TELEPHONE ENCOUNTER
We have received Gloria [employer]isk refill forms again via inter office.   This is NOT one of our current patients. We do not jump into cases that are already currently enrolled. The prescribing provider has to fill out refill forms and fax them to [employer]. These should not be coming to us.     Thank you!    Caridad Beverly   Prescription Assistance Assistant

## 2024-10-22 ENCOUNTER — TELEPHONE (OUTPATIENT)
Dept: FAMILY MEDICINE | Facility: CLINIC | Age: 59
End: 2024-10-22
Payer: COMMERCIAL

## 2024-10-22 ENCOUNTER — MYC MEDICAL ADVICE (OUTPATIENT)
Dept: FAMILY MEDICINE | Facility: CLINIC | Age: 59
End: 2024-10-22
Payer: COMMERCIAL

## 2024-10-22 NOTE — TELEPHONE ENCOUNTER
Forms/Letter Request    Type of form/letter: Gloria Nordisk Pt Assistance Program Refill / Reorder / Change Request    Do we have the form/letter: Yes:     Who is the form from? GLORIA     Where did/will the form come from? form was faxed in    When is form/letter needed by: asap    How would you like the form/letter returned: Fax : [phone]    Patient Notified form requests are processed in 5-7 business days:No    Could we send this information to you in [employer] or would you prefer to receive a phone call?:   na    Put in providers in box    Mariann K

## 2024-10-28 NOTE — TELEPHONE ENCOUNTER
Faxed to [phone]  Sent to [facility]  And filed in [facility]

## 2024-10-30 ENCOUNTER — TELEPHONE (OUTPATIENT)
Dept: FAMILY MEDICINE | Facility: CLINIC | Age: 59
End: 2024-10-30
Payer: COMMERCIAL

## 2024-10-30 NOTE — TELEPHONE ENCOUNTER
Current order is for Insulin Aspart (Novolog pen), as per medication list.      No insulin aspart (70/30) orders.     Please return call to clarify.     - Guillermo, CNP

## 2024-10-30 NOTE — TELEPHONE ENCOUNTER
Called [facility] Polina - explained note from Buffy     Explained Jessy García's note.   They will cancel the 70/30     Aj nordis assistance program   Nordis Novolog was sent through [facility] - Novolog shipped to patient. Polina states aj Nordis was mailed to the patient on 10/23     Called patient and explained what Polina stated   The patient states she called Aj Nord[facility] states yesterday the order should have came and it didn't. She called aj and they stated that the Novolg was not sent 10/23 because they are out of stock.     She states mail order is so for behind, this was ordered I August and still has not been able to get from Aj nordis.     Patient will continue to get refills from 107 order with 5 refills that was sent to [facility]   Patient states she pays out of pocket $ 176 per box.     Patient states understanding and denied further questions at this time.

## 2024-10-30 NOTE — TELEPHONE ENCOUNTER
Buffy from [employer] calling to report they have 2 orders on file for patient      Novolg flex pen   Insulin Aspart 70/30 mix   patient states she doesn't take 70/30 mix, reports that she takes humalog - but [facility] does not dispense humalog     Needs clarification from provider    [facility] already processed novolog flex pen      Please call [employer] [facility] [phone]

## 2024-11-07 ENCOUNTER — MYC MEDICAL ADVICE (OUTPATIENT)
Dept: FAMILY MEDICINE | Facility: CLINIC | Age: 59
End: 2024-11-07
Payer: COMMERCIAL

## 2024-11-08 NOTE — TELEPHONE ENCOUNTER
Please advise if patient can be seen sooner for px/renew cannabis. If so which slots okay to use.    See MyC messages below.

## 2024-12-06 SDOH — HEALTH STABILITY: PHYSICAL HEALTH: ON AVERAGE, HOW MANY DAYS PER WEEK DO YOU ENGAGE IN MODERATE TO STRENUOUS EXERCISE (LIKE A BRISK WALK)?: 2 DAYS

## 2024-12-06 SDOH — HEALTH STABILITY: PHYSICAL HEALTH: ON AVERAGE, HOW MANY MINUTES DO YOU ENGAGE IN EXERCISE AT THIS LEVEL?: 20 MIN

## 2024-12-06 ASSESSMENT — SOCIAL DETERMINANTS OF HEALTH (SDOH): HOW OFTEN DO YOU GET TOGETHER WITH FRIENDS OR RELATIVES?: TWICE A WEEK

## 2024-12-11 ENCOUNTER — OFFICE VISIT (OUTPATIENT)
Dept: FAMILY MEDICINE | Facility: CLINIC | Age: 59
End: 2024-12-11
Attending: NURSE PRACTITIONER
Payer: COMMERCIAL

## 2024-12-11 VITALS
RESPIRATION RATE: 18 BRPM | BODY MASS INDEX: 31.36 KG/M2 | WEIGHT: 177 LBS | HEIGHT: 63 IN | DIASTOLIC BLOOD PRESSURE: 74 MMHG | TEMPERATURE: 96.8 F | HEART RATE: 87 BPM | OXYGEN SATURATION: 99 % | SYSTOLIC BLOOD PRESSURE: 120 MMHG

## 2024-12-11 DIAGNOSIS — Z12.11 SCREEN FOR COLON CANCER: ICD-10-CM

## 2024-12-11 DIAGNOSIS — E11.42 DIABETIC PERIPHERAL NEUROPATHY (H): ICD-10-CM

## 2024-12-11 DIAGNOSIS — Z00.00 ROUTINE GENERAL MEDICAL EXAMINATION AT A HEALTH CARE FACILITY: Primary | ICD-10-CM

## 2024-12-11 DIAGNOSIS — D64.9 LOW HEMOGLOBIN: ICD-10-CM

## 2024-12-11 DIAGNOSIS — Z90.710 S/P HYSTERECTOMY: ICD-10-CM

## 2024-12-11 DIAGNOSIS — E11.40 TYPE 2 DIABETES MELLITUS WITH DIABETIC NEUROPATHY, WITH LONG-TERM CURRENT USE OF INSULIN (H): ICD-10-CM

## 2024-12-11 DIAGNOSIS — Z79.4 TYPE 2 DIABETES MELLITUS WITH DIABETIC NEUROPATHY, WITH LONG-TERM CURRENT USE OF INSULIN (H): ICD-10-CM

## 2024-12-11 DIAGNOSIS — E78.5 HYPERLIPIDEMIA LDL GOAL <100: ICD-10-CM

## 2024-12-11 DIAGNOSIS — I10 HYPERTENSION GOAL BP (BLOOD PRESSURE) < 140/90: ICD-10-CM

## 2024-12-11 DIAGNOSIS — N18.32 STAGE 3B CHRONIC KIDNEY DISEASE (H): ICD-10-CM

## 2024-12-11 LAB
ALBUMIN SERPL BCG-MCNC: 4 G/DL (ref 3.5–5.2)
ALP SERPL-CCNC: 54 U/L (ref 40–150)
ALT SERPL W P-5'-P-CCNC: 26 U/L (ref 0–50)
ANION GAP SERPL CALCULATED.3IONS-SCNC: 13 MMOL/L (ref 7–15)
AST SERPL W P-5'-P-CCNC: 32 U/L (ref 0–45)
BILIRUB SERPL-MCNC: 0.3 MG/DL
BUN SERPL-MCNC: 42.7 MG/DL (ref 8–23)
CALCIUM SERPL-MCNC: 8.7 MG/DL (ref 8.8–10.4)
CHLORIDE SERPL-SCNC: 101 MMOL/L (ref 98–107)
CHOLEST SERPL-MCNC: 215 MG/DL
CREAT SERPL-MCNC: 2.27 MG/DL (ref 0.51–0.95)
CREAT UR-MCNC: 50.3 MG/DL
EGFRCR SERPLBLD CKD-EPI 2021: 24 ML/MIN/1.73M2
ERYTHROCYTE [DISTWIDTH] IN BLOOD BY AUTOMATED COUNT: 12.9 % (ref 10–15)
EST. AVERAGE GLUCOSE BLD GHB EST-MCNC: 114 MG/DL
FASTING STATUS PATIENT QL REPORTED: ABNORMAL
FASTING STATUS PATIENT QL REPORTED: ABNORMAL
FERRITIN SERPL-MCNC: 61 NG/ML (ref 11–328)
GLUCOSE SERPL-MCNC: 109 MG/DL (ref 70–99)
HBA1C MFR BLD: 5.6 % (ref 0–5.6)
HCO3 SERPL-SCNC: 25 MMOL/L (ref 22–29)
HCT VFR BLD AUTO: 33.1 % (ref 35–47)
HDLC SERPL-MCNC: 38 MG/DL
HGB BLD-MCNC: 10.8 G/DL (ref 11.7–15.7)
IRON BINDING CAPACITY (ROCHE): 479 UG/DL (ref 240–430)
IRON SATN MFR SERPL: 16 % (ref 15–46)
IRON SERPL-MCNC: 79 UG/DL (ref 37–145)
LDLC SERPL CALC-MCNC: ABNORMAL MG/DL
LDLC SERPL DIRECT ASSAY-MCNC: 86 MG/DL
MCH RBC QN AUTO: 30 PG (ref 26.5–33)
MCHC RBC AUTO-ENTMCNC: 32.6 G/DL (ref 31.5–36.5)
MCV RBC AUTO: 92 FL (ref 78–100)
MICROALBUMIN UR-MCNC: 14.3 MG/L
MICROALBUMIN/CREAT UR: 28.43 MG/G CR (ref 0–25)
NONHDLC SERPL-MCNC: 177 MG/DL
PLATELET # BLD AUTO: 199 10E3/UL (ref 150–450)
POTASSIUM SERPL-SCNC: 4.9 MMOL/L (ref 3.4–5.3)
PROT SERPL-MCNC: 6.7 G/DL (ref 6.4–8.3)
RBC # BLD AUTO: 3.6 10E6/UL (ref 3.8–5.2)
SODIUM SERPL-SCNC: 139 MMOL/L (ref 135–145)
TRIGL SERPL-MCNC: 526 MG/DL
WBC # BLD AUTO: 5.5 10E3/UL (ref 4–11)

## 2024-12-11 PROCEDURE — 82043 UR ALBUMIN QUANTITATIVE: CPT | Performed by: NURSE PRACTITIONER

## 2024-12-11 PROCEDURE — 80061 LIPID PANEL: CPT | Performed by: NURSE PRACTITIONER

## 2024-12-11 PROCEDURE — 36415 COLL VENOUS BLD VENIPUNCTURE: CPT | Performed by: NURSE PRACTITIONER

## 2024-12-11 PROCEDURE — 82728 ASSAY OF FERRITIN: CPT | Performed by: NURSE PRACTITIONER

## 2024-12-11 PROCEDURE — 80053 COMPREHEN METABOLIC PANEL: CPT | Performed by: NURSE PRACTITIONER

## 2024-12-11 PROCEDURE — 99214 OFFICE O/P EST MOD 30 MIN: CPT | Mod: 25 | Performed by: NURSE PRACTITIONER

## 2024-12-11 PROCEDURE — 83540 ASSAY OF IRON: CPT | Performed by: NURSE PRACTITIONER

## 2024-12-11 PROCEDURE — 83721 ASSAY OF BLOOD LIPOPROTEIN: CPT | Mod: 59 | Performed by: NURSE PRACTITIONER

## 2024-12-11 PROCEDURE — 85027 COMPLETE CBC AUTOMATED: CPT | Performed by: NURSE PRACTITIONER

## 2024-12-11 PROCEDURE — 82570 ASSAY OF URINE CREATININE: CPT | Performed by: NURSE PRACTITIONER

## 2024-12-11 PROCEDURE — 83036 HEMOGLOBIN GLYCOSYLATED A1C: CPT | Performed by: NURSE PRACTITIONER

## 2024-12-11 PROCEDURE — 83550 IRON BINDING TEST: CPT | Performed by: NURSE PRACTITIONER

## 2024-12-11 PROCEDURE — 99396 PREV VISIT EST AGE 40-64: CPT | Performed by: NURSE PRACTITIONER

## 2024-12-11 RX ORDER — FENOFIBRATE 160 MG/1
TABLET ORAL
Qty: 90 TABLET | Refills: 3 | Status: SHIPPED | OUTPATIENT
Start: 2024-12-11

## 2024-12-11 RX ORDER — HYDROCHLOROTHIAZIDE 12.5 MG/1
12.5 TABLET ORAL DAILY
Qty: 90 TABLET | Refills: 3 | Status: SHIPPED | OUTPATIENT
Start: 2024-12-11

## 2024-12-11 RX ORDER — CARVEDILOL 12.5 MG/1
12.5 TABLET ORAL 2 TIMES DAILY WITH MEALS
Qty: 180 TABLET | Refills: 3 | Status: SHIPPED | OUTPATIENT
Start: 2024-12-11

## 2024-12-11 RX ORDER — AMLODIPINE BESYLATE 10 MG/1
10 TABLET ORAL DAILY
Qty: 90 TABLET | Refills: 3 | Status: SHIPPED | OUTPATIENT
Start: 2024-12-11

## 2024-12-11 RX ORDER — ESTRADIOL 2 MG/1
2 TABLET ORAL DAILY
Qty: 90 TABLET | Refills: 3 | Status: SHIPPED | OUTPATIENT
Start: 2024-12-11

## 2024-12-11 NOTE — PROGRESS NOTES
Preventive Care Visit  [facility] [place]  KEM Adams CNP, Family Medicine  Dec 11, 2024      Assessment & Plan     Jayda was seen today for physical.    Diagnoses and all orders for this visit:    Routine general medical examination at a health care facility  -     PRIMARY CARE FOLLOW-UP SCHEDULING  -     REVIEW OF HEALTH MAINTENANCE PROTOCOL ORDERS  -     PRIMARY CARE FOLLOW-UP SCHEDULING; Future    Screen for colon cancer  -     Fecal colorectal cancer screen (FIT)    Type 2 diabetes mellitus with diabetic neuropathy, with long-term current use of insulin (H)  Hemoglobin A1C   Date Value Ref Range Status   12/11/2024 5.6 0.0 - 5.6 % Final     Comment:     Normal <5.7%   Prediabetes 5.7-6.4%    Diabetes 6.5% or higher     Note: Adopted from ADA consensus guidelines.   11/21/2023 6.0 (H) 0.0 - 5.6 % Final     Comment:     Normal <5.7%   Prediabetes 5.7-6.4%    Diabetes 6.5% or higher     Note: Adopted from ADA consensus guidelines.   12/07/2022 6.1 (H) 0.0 - 5.6 % Final     Comment:     Normal <5.7%   Prediabetes 5.7-6.4%    Diabetes 6.5% or higher     Note: Adopted from ADA consensus guidelines.     Currently stable and well controlled Diabetes. Currently on metformin 500 mg two times daily and Basaglar/meal-time Novolog regimen.    -     Comprehensive metabolic panel (BMP + Alb, Alk Phos, ALT, AST, Total. Bili, TP)  -     CBC with platelets  -     metFORMIN (GLUCOPHAGE) 500 MG tablet; Take 1 tablet (500 mg) by mouth 2 times daily (with meals).  -     FOOT EXAM    Diabetic peripheral neuropathy (H)  20 year history of peripheral neuropathy with previous trials of Gabapentin and Lyrica (associated adverse effects without adequate relief).    Currently stable on Amitriptyline 25 mg at bedtime and as needed medical cannabis. Medical cannabis re-certification completed at today's visit.  -     HEMOGLOBIN A1C  -     Lipid panel reflex to direct LDL Non-fasting  -     amitriptyline (ELAVIL)  "25 MG tablet; Take 1 tablet (25 mg) by mouth at bedtime.    Stage 3b chronic kidney disease (H)  -     Albumin Random Urine Quantitative with Creat Ratio    Hypertension goal BP (blood pressure) < 140/90  Currently well controlled on antihypertensive regimen of Amlodipine 10 mg daily, Coreg 12.5 mg two times daily and hydrochlorothiazide 12.5 mg daily.    -     amLODIPine (NORVASC) 10 MG tablet; Take 1 tablet (10 mg) by mouth daily.  -     hydroCHLOROthiazide 12.5 MG tablet; Take 1 tablet (12.5 mg) by mouth daily.    S/P hysterectomy  Currently stable on Estrace 2 mg daily, previously tried tapering down dose and experienced significant return of migraines.    -     estradiol (ESTRACE) 2 MG tablet; Take 1 tablet (2 mg) by mouth daily.    Hyperlipidemia LDL goal <100  -     fenofibrate (TRIGLIDE/LOFIBRA) 160 MG tablet; TAKE 1 TABLET (160 MG) BY MOUTH ONCE DAILY WITH A MEAL.    Low hemoglobin  Further evaluation of iron levels.    -     Iron and iron binding capacity  -     Ferritin        BMI  Estimated body mass index is 31.35 kg/m  as calculated from the following:    Height as of this encounter: 1.6 m (5' 3\").    Weight as of this encounter: 80.3 kg (177 lb).     Counseling  Appropriate preventive services were addressed with this patient.  Checklist reviewing preventive services available has been given to the patient.    Return in about 6 months (around 6/11/2025) for Diabetes Follow-up, In Clinic.        Alvarez Montelongo is a 59 year old, presenting for the following:  Physical        12/11/2024     9:41 AM   Additional Questions   Roomed by Verónica IQBAL        Via the Health Maintenance questionnaire, the patient has reported the following services have been completed -Eye Exam: vision works [employer] 2024-07-01, this information has been sent to the abstraction team.    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      Medical " cannabis  Uses a gummy during the day, takes the neuropathy pain away and then is able to fully function.   Then uses a night-time gummy for more severe neuropathy pain.        Diabetes Follow-up    How often are you checking your blood sugar? 2-3 times a day  What concerns do you have today about your diabetes? None   Do you have any of these symptoms? (Select all that apply)  Numbness in feet      BP Readings from Last 2 Encounters:   12/11/24 120/74   11/22/23 128/80     Hemoglobin A1C (%)   Date Value   12/11/2024 5.6   11/21/2023 6.0 (H)     LDL Cholesterol Calculated (no units)   Date Value   11/21/2023      Comment:     Cannot estimate LDL when triglyceride exceeds 400 mg/dL   12/07/2022      Comment:     Cannot estimate LDL when triglyceride exceeds 400 mg/dL     LDL Cholesterol Direct (mg/dL)   Date Value   11/21/2023 63   12/07/2022 68   12/14/2021 103 (H)         Hypertension Follow-up    Do you check your blood pressure regularly outside of the clinic? No   Are you following a low salt diet? Yes  Are your blood pressures ever more than 140 on the top number (systolic) OR more      Health Care Directive  Patient does not have a Health Care Directive.        12/6/2024   General Health   How would you rate your overall physical health? Good   Feel stress (tense, anxious, or unable to sleep) Only a little      (!) STRESS CONCERN      12/6/2024   Nutrition   Three or more servings of calcium each day? Yes   Diet: Diabetic   How many servings of fruit and vegetables per day? (!) 0-1   How many sweetened beverages each day? 0-1            12/6/2024   Exercise   Days per week of moderate/strenous exercise 2 days   Average minutes spent exercising at this level 20 min      (!) EXERCISE CONCERN      12/6/2024   Social Factors   Frequency of gathering with friends or relatives Twice a week   Worry food won't last until get money to buy more Yes   Food not last or not have enough money for food? Yes   Do you have  housing? (Housing is defined as stable permanent housing and does not include staying ouside in a car, in a tent, in an abandoned building, in an overnight shelter, or couch-surfing.) Yes   Are you worried about losing your housing? No   Lack of transportation? No   Unable to get utilities (heat,electricity)? No      (!) FOOD SECURITY CONCERN PRESENT      12/6/2024   Fall Risk   Fallen 2 or more times in the past year? No    Trouble with walking or balance? No        Patient-reported          12/6/2024   Dental   Dentist two times every year? (!) NO            12/6/2024   TB Screening   Were you born outside of the [place]? No            Today's PHQ-2 Score:       12/11/2024     9:39 AM   PHQ-2 ( 1999 [employer])   Q1: Little interest or pleasure in doing things 1    Q2: Feeling down, depressed or hopeless 1    PHQ-2 Score 2    Q1: Little interest or pleasure in doing things Several days   Q2: Feeling down, depressed or hopeless Several days   PHQ-2 Score 2       Patient-reported           12/6/2024   Substance Use   Alcohol more than 3/day or more than 7/wk Not Applicable   Do you use any other substances recreationally? (!) CANNABIS PRODUCTS        Social History     Tobacco Use    Smoking status: Never    Smokeless tobacco: Never   Vaping Use    Vaping status: Never Used   Substance Use Topics    Alcohol use: Never    Drug use: Never         4/17/2024   LAST FHS-7 RESULTS   1st degree relative breast or ovarian cancer No   Any relative bilateral breast cancer No   Any male have breast cancer No   Any ONE woman have BOTH breast AND ovarian cancer No   Any woman with breast cancer before 50yrs No   2 or more relatives with breast AND/OR ovarian cancer No   2 or more relatives with breast AND/OR bowel cancer No        Mammogram Screening - Mammogram every 1-2 years updated in Health Maintenance based on mutual decision making          12/6/2024   One time HIV Screening   Previous HIV test? No          12/6/2024   STI  Screening   New sexual partner(s) since last STI/HIV test? (!) YES         History of abnormal Pap smear: Status post hysterectomy with removal of cervix and no history of CIN2 or greater or cervical cancer. Health Maintenance and Surgical History updated.       ASCVD Risk   The 10-year ASCVD risk score (Brendon TSE, et al., 2019) is: 11.1%    Values used to calculate the score:      Age: 59 years      Sex: Female      Is Non- : No      Diabetic: Yes      Tobacco smoker: No      Systolic Blood Pressure: 120 mmHg      Is BP treated: Yes      HDL Cholesterol: 32 mg/dL      Total Cholesterol: 194 mg/dL      Reviewed and updated as needed this visit by Provider                    BP Readings from Last 3 Encounters:   12/11/24 120/74   11/22/23 128/80   11/07/23 126/66    Wt Readings from Last 3 Encounters:   12/11/24 80.3 kg (177 lb)   11/22/23 82.1 kg (181 lb)   11/07/23 82.3 kg (181 lb 6.4 oz)                  Patient Active Problem List   Diagnosis    Type 2 diabetes mellitus with diabetic neuropathy, with long-term current use of insulin (H)    Stage 3b chronic kidney disease (H)    Diabetic peripheral neuropathy (H)    S/P hysterectomy    Gastroesophageal reflux disease without esophagitis    Essential hypertension    Hypertriglyceridemia    BARRINGTON (obstructive sleep apnea)     Past Surgical History:   Procedure Laterality Date    BACK SURGERY  2020    HYSTERECTOMY, PAP NO LONGER INDICATED Bilateral     Endometriosis at age 32    LAPAROSCOPIC CHOLECYSTECTOMY      LUMBAR DISCECTOMY      at age 38- back pain free.       Social History     Tobacco Use    Smoking status: Never    Smokeless tobacco: Never   Substance Use Topics    Alcohol use: Never     Family History   Problem Relation Age of Onset    Breast Cancer No family hx of     Colon Cancer No family hx of     Diabetes No family hx of          Current Outpatient Medications   Medication Sig Dispense Refill    amitriptyline (ELAVIL) 25  MG tablet Take 1 tablet (25 mg) by mouth at bedtime. 90 tablet 3    amLODIPine (NORVASC) 10 MG tablet Take 1 tablet (10 mg) by mouth daily. 90 tablet 3    aspirin (ASA) 81 MG EC tablet Take 1 tablet (81 mg) by mouth every 3 days      carvedilol (COREG) 12.5 MG tablet Take 1 tablet (12.5 mg) by mouth 2 times daily (with meals) 180 tablet 1    cyclobenzaprine (FLEXERIL) 5 MG tablet Take 1 tablet (5 mg) by mouth 3 times daily as needed for muscle spasms 30 tablet 1    estradiol (ESTRACE) 2 MG tablet Take 1 tablet (2 mg) by mouth daily. 90 tablet 3    fenofibrate (TRIGLIDE/LOFIBRA) 160 MG tablet TAKE 1 TABLET (160 MG) BY MOUTH ONCE DAILY WITH A MEAL. 90 tablet 3    hydroCHLOROthiazide 12.5 MG tablet Take 1 tablet (12.5 mg) by mouth daily. 90 tablet 3    insulin aspart (NOVOLOG PEN) 100 UNIT/ML pen Inject 8-14 Units subcutaneously 3 times daily (with meals). SSI 30 mL 5    insulin glargine (BASAGLAR KWIKPEN) 100 UNIT/ML pen Inject 24 Units Subcutaneous daily 15 mL 3    metFORMIN (GLUCOPHAGE) 500 MG tablet Take 1 tablet (500 mg) by mouth 2 times daily (with meals). 180 tablet 3    omeprazole (PRILOSEC) 40 MG DR capsule Take 1 capsule by mouth      blood glucose (ACCU-CHEK SMARTVIEW) test strip USE TO TEST BLOOD SUGAR 4 TIMES DAILY OR AS DIRECTED. 400 strip 3    blood glucose ([employer] BLOOD GLUCOSE TEST) test strip As directed. Test 3 times per day. accu-check, smartview nixon machine please      blood glucose (NO BRAND SPECIFIED) test strip Use to test blood sugar 2 times daily or as directed. To accompany: Blood Glucose Monitor Brands: ACCU-CHEK NIXON 100 strip 6    blood glucose calibration (NO BRAND SPECIFIED) solution Use to calibrate blood glucose monitor as needed as directed. To accompany: Blood Glucose Monitor Brands: ACCU-CHEK NIXON 100 each 3    blood glucose monitoring (NO BRAND SPECIFIED) meter device kit Use to test blood sugar 2 times daily or as directed. Brand Accu-check Nixon 1 kit 0    insulin pen needle (32G  "X 4 MM) 32G X 4 MM miscellaneous Use 4 pen needles daily or as directed. 200 each 1    ondansetron (ZOFRAN) 4 MG tablet       STATIN NOT PRESCRIBED (INTENTIONAL) Please choose reason not prescribed from choices below. (Patient not taking: Reported on 12/11/2024)      SUMAtriptan (IMITREX) 50 MG tablet       thin (NO BRAND SPECIFIED) lancets Use to test blood sugar 2 times daily or as directed. To accompany: Blood Glucose Monitor Brands: ACCU-CHEK MARCIE 200 each 3         Review of Systems  Constitutional, HEENT, cardiovascular, pulmonary, gi and gu systems are negative, except as otherwise noted.     Objective    Exam  /74   Pulse 87   Temp 96.8  F (36  C) (Tympanic)   Resp 18   Ht 1.6 m (5' 3\")   Wt 80.3 kg (177 lb)   SpO2 99%   BMI 31.35 kg/m     Estimated body mass index is 31.35 kg/m  as calculated from the following:    Height as of this encounter: 1.6 m (5' 3\").    Weight as of this encounter: 80.3 kg (177 lb).    Physical Exam    GENERAL: alert and no distress  EYES: Eyes grossly normal to inspection  HENT: ear canals and TM's normal, nose and mouth without ulcers or lesions  NECK: no adenopathy, no asymmetry, masses, or scars  RESP: lungs clear to auscultation - no rales, rhonchi or wheezes  CV: regular rate and rhythm, normal S1 S2, no S3 or S4, no murmur, click or rub, no peripheral edema  ABDOMEN: soft, nontender, no hepatosplenomegaly, no masses and bowel sounds normal  MS: no gross musculoskeletal defects noted, no edema  SKIN: no suspicious lesions or rashes  NEURO: Normal strength and tone, mentation intact and speech normal  PSYCH: mentation appears normal, affect normal/bright    Diabetic Foot Screen:    Any complaints of increased pain or numbness ?  YES chronic numbness/neuropathy  Is there a foot ulcer now or a history of foot ulcer? No   Does the foot have an abnormal shape? No   Are the nails thick, too long or ingrown? No   Are there any redness or open areas? No      "       Sensation Testing done at all points on the diagram with monofilament     Right Foot: Sensation Absent at the following points , 2, and 3  Left Foot: Sensation Absent at the following points , 1, and 2     Risk Category: 1- Loss of protective sensation with normal appearing foot (no weakness, deformity, callous, pre-ulcer or h/o ulceration)  Performed by KEM Adams CNP          Signed Electronically by: KEM Adams CNP

## 2024-12-11 NOTE — PATIENT INSTRUCTIONS
Results for orders placed or performed in visit on 12/11/24   HEMOGLOBIN A1C     Status: Normal   Result Value Ref Range    Estimated Average Glucose 114 <117 mg/dL    Hemoglobin A1C 5.6 0.0 - 5.6 %   CBC with platelets     Status: Abnormal   Result Value Ref Range    WBC Count 5.5 4.0 - 11.0 10e3/uL    RBC Count 3.60 (L) 3.80 - 5.20 10e6/uL    Hemoglobin 10.8 (L) 11.7 - 15.7 g/dL    Hematocrit 33.1 (L) 35.0 - 47.0 %    MCV 92 78 - 100 fL    MCH 30.0 26.5 - 33.0 pg    MCHC 32.6 31.5 - 36.5 g/dL    RDW 12.9 10.0 - 15.0 %    Platelet Count 199 150 - 450 10e3/uL             Patient Education   Preventive Care Advice   This is general advice given by our system to help you stay healthy. However, your care team may have specific advice just for you. Please talk to your care team about your preventive care needs.  Nutrition  Eat 5 or more servings of fruits and vegetables each day.  Try wheat bread, brown rice and whole grain pasta (instead of white bread, rice, and pasta).  Get enough calcium and vitamin D. Check the label on foods and aim for 100% of the RDA (recommended daily allowance).  Lifestyle  Exercise at least 150 minutes each week  (30 minutes a day, 5 days a week).  Do muscle strengthening activities 2 days a week. These help control your weight and prevent disease.  No smoking.  Wear sunscreen to prevent skin cancer.  Have a dental exam and cleaning every 6 months.  Yearly exams  See your health care team every year to talk about:  Any changes in your health.  Any medicines your care team has prescribed.  Preventive care, family planning, and ways to prevent chronic diseases.  Shots (vaccines)   HPV shots (up to age 26), if you've never had them before.  Hepatitis B shots (up to age 59), if you've never had them before.  COVID-19 shot: Get this shot when it's due.  Flu shot: Get a flu shot every year.  Tetanus shot: Get a tetanus shot every 10 years.  Pneumococcal, hepatitis A, and RSV shots: Ask your care  team if you need these based on your risk.  Shingles shot (for age 50 and up)  General health tests  Diabetes screening:  Starting at age 35, Get screened for diabetes at least every 3 years.  If you are younger than age 35, ask your care team if you should be screened for diabetes.  Cholesterol test: At age 39, start having a cholesterol test every 5 years, or more often if advised.  Bone density scan (DEXA): At age 50, ask your care team if you should have this scan for osteoporosis (brittle bones).  Hepatitis C: Get tested at least once in your life.  STIs (sexually transmitted infections)  Before age 24: Ask your care team if you should be screened for STIs.  After age 24: Get screened for STIs if you're at risk. You are at risk for STIs (including HIV) if:  You are sexually active with more than one person.  You don't use condoms every time.  You or a partner was diagnosed with a sexually transmitted infection.  If you are at risk for HIV, ask about PrEP medicine to prevent HIV.  Get tested for HIV at least once in your life, whether you are at risk for HIV or not.  Cancer screening tests  Cervical cancer screening: If you have a cervix, begin getting regular cervical cancer screening tests starting at age 21.  Breast cancer scan (mammogram): If you've ever had breasts, begin having regular mammograms starting at age 40. This is a scan to check for breast cancer.  Colon cancer screening: It is important to start screening for colon cancer at age 45.  Have a colonoscopy test every 10 years (or more often if you're at risk) Or, ask your provider about stool tests like a FIT test every year or Cologuard test every 3 years.  To learn more about your testing options, visit:   .  For help making a decision, visit:   https://bit.ly/mi47222.  Prostate cancer screening test: If you have a prostate, ask your care team if a prostate cancer screening test (PSA) at age 55 is right for you.  Lung cancer screening: If you are  a current or former smoker ages 50 to 80, ask your care team if ongoing lung cancer screenings are right for you.  For informational purposes only. Not to replace the advice of your health care provider. Copyright   2023 [employer]. All rights reserved. Clinically reviewed by the [facility] Transitions Program. [employer] [id number] - REV 01/24.

## 2025-01-06 ENCOUNTER — TELEPHONE (OUTPATIENT)
Dept: FAMILY MEDICINE | Facility: CLINIC | Age: 60
End: 2025-01-06
Payer: COMMERCIAL

## 2025-01-06 NOTE — TELEPHONE ENCOUNTER
Forms/Letter Request    Type of form/letter: Gloria Kaylen - Pt assistance program application to complete by provider    Do we have the form/letter: Yes:     Who is the form from? Patient    Where did/will the form come from? Patient or family brought in       When is form/letter needed by: asap    How would you like the form/letter returned: Fax : [phone]    Patient Notified form requests are processed in 5-7 business days:No    Could we send this information to you in [employer] or would you prefer to receive a phone call?:   na    Put In providers in box    Mariann K

## 2025-01-23 ENCOUNTER — TELEPHONE (OUTPATIENT)
Dept: FAMILY MEDICINE | Facility: CLINIC | Age: 60
End: 2025-01-23
Payer: COMMERCIAL

## 2025-02-17 DIAGNOSIS — E11.40 TYPE 2 DIABETES MELLITUS WITH DIABETIC NEUROPATHY, WITH LONG-TERM CURRENT USE OF INSULIN (H): ICD-10-CM

## 2025-02-17 DIAGNOSIS — Z79.4 TYPE 2 DIABETES MELLITUS WITH DIABETIC NEUROPATHY, WITH LONG-TERM CURRENT USE OF INSULIN (H): ICD-10-CM

## 2025-02-17 RX ORDER — INSULIN GLARGINE 100 [IU]/ML
INJECTION, SOLUTION SUBCUTANEOUS
Qty: 30 ML | Refills: 3 | Status: SHIPPED | OUTPATIENT
Start: 2025-02-17

## 2025-02-24 ENCOUNTER — TELEPHONE (OUTPATIENT)
Dept: FAMILY MEDICINE | Facility: CLINIC | Age: 60
End: 2025-02-24
Payer: COMMERCIAL

## 2025-02-24 NOTE — TELEPHONE ENCOUNTER
We have received an approval fax from [id number] for NovoLog through 2/15/26. We did not assist with enrollment.   I am sending approval notice via interoffice mail to Jessy García.     Thank you!  Caridad Beverly   Prescription

## 2025-03-06 ENCOUNTER — TELEPHONE (OUTPATIENT)
Dept: FAMILY MEDICINE | Facility: CLINIC | Age: 60
End: 2025-03-06
Payer: COMMERCIAL

## 2025-03-06 NOTE — TELEPHONE ENCOUNTER
Forms/Letter Request    Type of form/letter: Diabetic Supplies      Do we have the form/letter: Yes: Placed in Jessy García's bin at the     Who is the form from? Patient    Where did/will the form come from? Patient or family brought in       When is form/letter needed by: Next Tuesday, if possible    How would you like the form/letter returned:     Patient Notified form requests are processed in 5-7 business days:Yes    Could we send this information to you in [facility] or would you prefer to receive a phone call?:   Patient would prefer a phone call   Okay to leave a detailed message?: Yes at Home number on file There is no home phone number on file.

## 2025-03-06 NOTE — TELEPHONE ENCOUNTER
Sent the information by Email to Roxy Pruitt to complete the forms and get them sent on to [facility].    Placed forms in the North side Drawer.

## 2025-03-13 ENCOUNTER — MYC MEDICAL ADVICE (OUTPATIENT)
Dept: FAMILY MEDICINE | Facility: CLINIC | Age: 60
End: 2025-03-13
Payer: COMMERCIAL

## 2025-03-13 NOTE — TELEPHONE ENCOUNTER
DARREN García completed the forms and they were faxed to [facility] at [phone]  Sent to hospitals  And filed in the north drawer.    Also placed copy to .

## 2025-03-13 NOTE — TELEPHONE ENCOUNTER
Sent pages to Prescription assistance again today, also placed blank forms at the  as the patient wanted to .

## 2025-03-22 ENCOUNTER — HEALTH MAINTENANCE LETTER (OUTPATIENT)
Age: 60
End: 2025-03-22

## 2025-05-06 ENCOUNTER — TELEPHONE (OUTPATIENT)
Dept: FAMILY MEDICINE | Facility: CLINIC | Age: 60
End: 2025-05-06
Payer: COMMERCIAL

## 2025-05-06 NOTE — TELEPHONE ENCOUNTER
Home Health Care      Reason for call:  Novolog Flex Pen 5x3 ml - patient assistance program insulin affordability program refill    Orders are needed for this patient.  above    Pt Provider: DARREN García    Phone Number Homecare Nurse can be reached at: fax       Could we send this information to you in [employer] or would you prefer to receive a phone call?:   na    Put in providers in box    Mariann K

## 2025-05-13 ENCOUNTER — TELEPHONE (OUTPATIENT)
Dept: FAMILY MEDICINE | Facility: CLINIC | Age: 60
End: 2025-05-13
Payer: COMMERCIAL

## 2025-05-13 NOTE — TELEPHONE ENCOUNTER
Forms/Letter Request    Type of form/letter:     Missing information needed - Pt Id#[id number] - known drug allergies missing, all in handwriting, no stamps, qty needs to state 120 days or 4 month supply, state license # needs to be on form also.  Put license # on form and attached allergy list    Do we have the form/letter: Yes:     Who is the form from? Home care    Where did/will the form come from? form was faxed in    When is form/letter needed by: asap    How would you like the form/letter returned: Fax : [phone]    Patient Notified form requests are processed in 5-7 business days:No    Could we send this information to you in [place] or would you prefer to receive a phone call?:   Patient would prefer a phone call   Okay to leave a detailed message?: No at Cell number on file:    Telephone Information:   Mobile [phone]     Mariann WHITNEY

## 2025-05-15 ENCOUNTER — TELEPHONE (OUTPATIENT)
Dept: FAMILY MEDICINE | Facility: CLINIC | Age: 60
End: 2025-05-15
Payer: COMMERCIAL

## 2025-05-15 NOTE — TELEPHONE ENCOUNTER
We have received the [employer] refill forms for NovoLog.   Since we did not assist patient with enrollment for 2025, these forms will need to be faxed to [place].   I am sending back to the [facility] via inter office mail.     Thank you!  Caridad Beverly   Prescription

## 2025-07-05 ENCOUNTER — HEALTH MAINTENANCE LETTER (OUTPATIENT)
Age: 60
End: 2025-07-05